# Patient Record
Sex: FEMALE | Race: WHITE | NOT HISPANIC OR LATINO | URBAN - METROPOLITAN AREA
[De-identification: names, ages, dates, MRNs, and addresses within clinical notes are randomized per-mention and may not be internally consistent; named-entity substitution may affect disease eponyms.]

---

## 2017-01-04 ENCOUNTER — OUTPATIENT (OUTPATIENT)
Dept: OUTPATIENT SERVICES | Facility: HOSPITAL | Age: 37
LOS: 1 days | End: 2017-01-04
Payer: MEDICAID

## 2017-01-04 DIAGNOSIS — M79.671 PAIN IN RIGHT FOOT: ICD-10-CM

## 2017-01-18 ENCOUNTER — APPOINTMENT (OUTPATIENT)
Dept: RHEUMATOLOGY | Facility: CLINIC | Age: 37
End: 2017-01-18

## 2017-01-18 VITALS
HEART RATE: 108 BPM | OXYGEN SATURATION: 97 % | BODY MASS INDEX: 42.86 KG/M2 | HEIGHT: 61 IN | WEIGHT: 227 LBS | DIASTOLIC BLOOD PRESSURE: 103 MMHG | SYSTOLIC BLOOD PRESSURE: 150 MMHG

## 2017-01-18 DIAGNOSIS — M25.562 PAIN IN RIGHT KNEE: ICD-10-CM

## 2017-01-18 DIAGNOSIS — M25.561 PAIN IN RIGHT KNEE: ICD-10-CM

## 2017-01-19 LAB
25(OH)D3 SERPL-MCNC: 13.9 NG/ML
APPEARANCE: CLEAR
BILIRUBIN URINE: NEGATIVE
BLOOD URINE: NEGATIVE
C3 SERPL-MCNC: 151 MG/DL
C4 SERPL-MCNC: 39 MG/DL
COLOR: YELLOW
CRP SERPL-MCNC: 0.45 MG/DL
DEPRECATED KAPPA LC FREE/LAMBDA SER: 1.07 RATIO
DSDNA AB SER-ACNC: <12 IU/ML
ERYTHROCYTE [SEDIMENTATION RATE] IN BLOOD BY WESTERGREN METHOD: 19 MM/HR
FERRITIN SERPL-MCNC: 20.2 NG/ML
FOLATE SERPL-MCNC: 6.9 NG/ML
GLUCOSE QUALITATIVE U: NORMAL MG/DL
IGA SER QL IEP: 117 MG/DL
IGG SER QL IEP: 982 MG/DL
IGM SER QL IEP: 65 MG/DL
IRON SATN MFR SERPL: 11 %
IRON SERPL-MCNC: 36 UG/DL
KAPPA LC CSF-MCNC: 1.08 MG/DL
KAPPA LC SERPL-MCNC: 1.16 MG/DL
KETONES URINE: ABNORMAL
LEUKOCYTE ESTERASE URINE: NEGATIVE
NITRITE URINE: NEGATIVE
PH URINE: 6
PROTEIN URINE: NEGATIVE MG/DL
RHEUMATOID FACT SER QL: <7 IU/ML
SPECIFIC GRAVITY URINE: 1.04
TIBC SERPL-MCNC: 319 UG/DL
UIBC SERPL-MCNC: 283 UG/DL
UROBILINOGEN URINE: 1 MG/DL
VIT B12 SERPL-MCNC: 563 PG/ML

## 2017-02-07 ENCOUNTER — APPOINTMENT (OUTPATIENT)
Dept: RHEUMATOLOGY | Facility: CLINIC | Age: 37
End: 2017-02-07

## 2017-02-07 VITALS — SYSTOLIC BLOOD PRESSURE: 150 MMHG | HEIGHT: 61 IN | DIASTOLIC BLOOD PRESSURE: 106 MMHG | HEART RATE: 106 BPM

## 2017-02-07 DIAGNOSIS — B35.9 DERMATOPHYTOSIS, UNSPECIFIED: ICD-10-CM

## 2017-02-07 DIAGNOSIS — E61.1 IRON DEFICIENCY: ICD-10-CM

## 2017-03-08 PROCEDURE — 97112 NEUROMUSCULAR REEDUCATION: CPT

## 2017-03-08 PROCEDURE — 97116 GAIT TRAINING THERAPY: CPT

## 2017-03-08 PROCEDURE — 97162 PT EVAL MOD COMPLEX 30 MIN: CPT

## 2017-03-08 PROCEDURE — 97110 THERAPEUTIC EXERCISES: CPT

## 2017-03-08 PROCEDURE — 97140 MANUAL THERAPY 1/> REGIONS: CPT

## 2017-04-07 ENCOUNTER — APPOINTMENT (OUTPATIENT)
Dept: RHEUMATOLOGY | Facility: CLINIC | Age: 37
End: 2017-04-07

## 2017-04-07 DIAGNOSIS — M17.10 UNILATERAL PRIMARY OSTEOARTHRITIS, UNSPECIFIED KNEE: ICD-10-CM

## 2017-04-07 DIAGNOSIS — L30.9 DERMATITIS, UNSPECIFIED: ICD-10-CM

## 2017-04-07 DIAGNOSIS — L50.9 URTICARIA, UNSPECIFIED: ICD-10-CM

## 2017-04-07 RX ORDER — CLOTRIMAZOLE 10 MG/G
1 CREAM TOPICAL
Qty: 1 | Refills: 2 | Status: DISCONTINUED | COMMUNITY
Start: 2017-02-07 | End: 2017-04-07

## 2017-04-07 RX ORDER — METRONIDAZOLE 7.5 MG/G
0.75 CREAM TOPICAL DAILY
Qty: 1 | Refills: 2 | Status: DISCONTINUED | COMMUNITY
Start: 2017-02-07 | End: 2017-04-07

## 2017-04-07 RX ORDER — CETIRIZINE HYDROCHLORIDE 10 MG/1
10 TABLET, COATED ORAL DAILY
Qty: 30 | Refills: 2 | Status: DISCONTINUED | COMMUNITY
Start: 2017-02-07 | End: 2017-04-07

## 2017-04-08 PROBLEM — M17.10 KNEE OSTEOARTHRITIS: Status: ACTIVE | Noted: 2017-02-07

## 2017-04-08 PROBLEM — L50.9 URTICARIA: Status: ACTIVE | Noted: 2017-02-07

## 2018-01-22 ENCOUNTER — INPATIENT (INPATIENT)
Facility: HOSPITAL | Age: 38
LOS: 3 days | Discharge: HOME | DRG: 305 | End: 2018-01-26
Attending: INTERNAL MEDICINE | Admitting: INTERNAL MEDICINE
Payer: MEDICAID

## 2018-01-22 VITALS
RESPIRATION RATE: 20 BRPM | TEMPERATURE: 99 F | OXYGEN SATURATION: 99 % | HEART RATE: 124 BPM | SYSTOLIC BLOOD PRESSURE: 177 MMHG | WEIGHT: 225.09 LBS | HEIGHT: 62 IN | DIASTOLIC BLOOD PRESSURE: 118 MMHG

## 2018-01-22 LAB
ALBUMIN SERPL ELPH-MCNC: 4.9 G/DL — SIGNIFICANT CHANGE UP (ref 3.3–5)
ALP SERPL-CCNC: 71 U/L — SIGNIFICANT CHANGE UP (ref 40–120)
ALT FLD-CCNC: 19 U/L RC — SIGNIFICANT CHANGE UP (ref 10–45)
ANION GAP SERPL CALC-SCNC: 17 MMOL/L — SIGNIFICANT CHANGE UP (ref 5–17)
AST SERPL-CCNC: 23 U/L — SIGNIFICANT CHANGE UP (ref 10–40)
BASOPHILS # BLD AUTO: 0 K/UL — SIGNIFICANT CHANGE UP (ref 0–0.2)
BASOPHILS NFR BLD AUTO: 0.4 % — SIGNIFICANT CHANGE UP (ref 0–2)
BILIRUB SERPL-MCNC: 0.4 MG/DL — SIGNIFICANT CHANGE UP (ref 0.2–1.2)
BUN SERPL-MCNC: 8 MG/DL — SIGNIFICANT CHANGE UP (ref 7–23)
CALCIUM SERPL-MCNC: 9.9 MG/DL — SIGNIFICANT CHANGE UP (ref 8.4–10.5)
CHLORIDE SERPL-SCNC: 98 MMOL/L — SIGNIFICANT CHANGE UP (ref 96–108)
CO2 SERPL-SCNC: 22 MMOL/L — SIGNIFICANT CHANGE UP (ref 22–31)
CREAT SERPL-MCNC: 0.76 MG/DL — SIGNIFICANT CHANGE UP (ref 0.5–1.3)
EOSINOPHIL # BLD AUTO: 0 K/UL — SIGNIFICANT CHANGE UP (ref 0–0.5)
EOSINOPHIL NFR BLD AUTO: 0.3 % — SIGNIFICANT CHANGE UP (ref 0–6)
GLUCOSE SERPL-MCNC: 103 MG/DL — HIGH (ref 70–99)
HCT VFR BLD CALC: 42.9 % — SIGNIFICANT CHANGE UP (ref 34.5–45)
HGB BLD-MCNC: 15 G/DL — SIGNIFICANT CHANGE UP (ref 11.5–15.5)
LYMPHOCYTES # BLD AUTO: 2.8 K/UL — SIGNIFICANT CHANGE UP (ref 1–3.3)
LYMPHOCYTES # BLD AUTO: 24.4 % — SIGNIFICANT CHANGE UP (ref 13–44)
MAGNESIUM SERPL-MCNC: 2 MG/DL — SIGNIFICANT CHANGE UP (ref 1.6–2.6)
MCHC RBC-ENTMCNC: 28.7 PG — SIGNIFICANT CHANGE UP (ref 27–34)
MCHC RBC-ENTMCNC: 35 GM/DL — SIGNIFICANT CHANGE UP (ref 32–36)
MCV RBC AUTO: 82.1 FL — SIGNIFICANT CHANGE UP (ref 80–100)
MONOCYTES # BLD AUTO: 0.6 K/UL — SIGNIFICANT CHANGE UP (ref 0–0.9)
MONOCYTES NFR BLD AUTO: 5.1 % — SIGNIFICANT CHANGE UP (ref 2–14)
NEUTROPHILS # BLD AUTO: 8 K/UL — HIGH (ref 1.8–7.4)
NEUTROPHILS NFR BLD AUTO: 69.7 % — SIGNIFICANT CHANGE UP (ref 43–77)
PHOSPHATE SERPL-MCNC: 2.2 MG/DL — LOW (ref 2.5–4.5)
PLATELET # BLD AUTO: 376 K/UL — SIGNIFICANT CHANGE UP (ref 150–400)
POTASSIUM SERPL-MCNC: 3.6 MMOL/L — SIGNIFICANT CHANGE UP (ref 3.5–5.3)
POTASSIUM SERPL-SCNC: 3.6 MMOL/L — SIGNIFICANT CHANGE UP (ref 3.5–5.3)
PROT SERPL-MCNC: 8.4 G/DL — HIGH (ref 6–8.3)
RBC # BLD: 5.22 M/UL — HIGH (ref 3.8–5.2)
RBC # FLD: 12.6 % — SIGNIFICANT CHANGE UP (ref 10.3–14.5)
SODIUM SERPL-SCNC: 137 MMOL/L — SIGNIFICANT CHANGE UP (ref 135–145)
WBC # BLD: 11.5 K/UL — HIGH (ref 3.8–10.5)
WBC # FLD AUTO: 11.5 K/UL — HIGH (ref 3.8–10.5)

## 2018-01-22 PROCEDURE — 93010 ELECTROCARDIOGRAM REPORT: CPT

## 2018-01-22 PROCEDURE — 99285 EMERGENCY DEPT VISIT HI MDM: CPT | Mod: 25

## 2018-01-22 PROCEDURE — 71275 CT ANGIOGRAPHY CHEST: CPT | Mod: 26

## 2018-01-22 PROCEDURE — 71046 X-RAY EXAM CHEST 2 VIEWS: CPT | Mod: 26

## 2018-01-22 RX ORDER — SERTRALINE 25 MG/1
100 TABLET, FILM COATED ORAL ONCE
Qty: 0 | Refills: 0 | Status: COMPLETED | OUTPATIENT
Start: 2018-01-22 | End: 2018-01-22

## 2018-01-22 RX ORDER — DIPHENHYDRAMINE HCL 50 MG
25 CAPSULE ORAL ONCE
Qty: 0 | Refills: 0 | Status: COMPLETED | OUTPATIENT
Start: 2018-01-22 | End: 2018-01-22

## 2018-01-22 RX ORDER — SODIUM CHLORIDE 9 MG/ML
1000 INJECTION INTRAMUSCULAR; INTRAVENOUS; SUBCUTANEOUS ONCE
Qty: 0 | Refills: 0 | Status: COMPLETED | OUTPATIENT
Start: 2018-01-22 | End: 2018-01-22

## 2018-01-22 RX ADMIN — SODIUM CHLORIDE 1000 MILLILITER(S): 9 INJECTION INTRAMUSCULAR; INTRAVENOUS; SUBCUTANEOUS at 19:26

## 2018-01-22 RX ADMIN — Medication 25 MILLIGRAM(S): at 19:28

## 2018-01-22 RX ADMIN — SERTRALINE 100 MILLIGRAM(S): 25 TABLET, FILM COATED ORAL at 20:48

## 2018-01-22 RX ADMIN — Medication 40 MILLIGRAM(S): at 19:25

## 2018-01-22 NOTE — ED PROVIDER NOTE - MEDICAL DECISION MAKING DETAILS
Patient presenting with palpitations and diffuse urticaria. Likely allergic reaction, but unknown etiology. Will treat with benadryl, IV fluids and prednisone. Check TSH.

## 2018-01-22 NOTE — ED PROVIDER NOTE - PHYSICAL EXAMINATION
General: NAD, anxious; sitting up in bed  HEENT: PERRL; normal oropharynx  Neck: no JVD  Respiratory: CTAB  CV: tachcyardic, no murmurs  GI: abdomen soft, non-tender, non-distended  Neurology: AAOx3, no focal deficits  Psych: anxious  Extremities: No edema, + peripheral pulses  Skin: diffuse urticaria on chest

## 2018-01-22 NOTE — ED PROVIDER NOTE - OBJECTIVE STATEMENT
37-year-old woman with history of anxiety and frequent hives who presents to the ED with palpitations. The patient has been having these symptoms for the past 1 week. 37-year-old woman with history of anxiety and frequent hives who presents to the ED with palpitations. The patient has been having these symptoms for the past 1 week. She has been feeling palpitations occasionally for many years, but they have been exacerbated over the past week. She recently had her valium decreased from 10 mg to 5 mg. Her hives have been going on for several years and have also increased over the past few days. She endorses some anxiety, diarrhea, chest pressure but no nausea/vomiting. She is a former smoker. She says she has had normal thyroid testing in the past and was recently found to have an elevated OSCAR.

## 2018-01-22 NOTE — ED PROVIDER NOTE - NS ED ROS FT
REVIEW OF SYSTEMS:    CONSTITUTIONAL: No weakness, fevers or chills  EYES/ENT: No visual changes;  No vertigo or throat pain   NECK: No pain or stiffness  RESPIRATORY: No cough, wheezing, hemoptysis; No shortness of breath  CARDIOVASCULAR: +No chest pain and palpitations  GASTROINTESTINAL: +diarrhea; No abdominal pain. No nausea, vomiting, or hematemesis  GENITOURINARY: No dysuria, frequency or hematuria  NEUROLOGICAL: No numbness or weakness  MUSCULOSKELETAL: No joint pain. Normal range of motion  SKIN: +rashes  All other review of systems is negative unless indicated above.

## 2018-01-22 NOTE — ED PROVIDER NOTE - PROGRESS NOTE DETAILS
Patient to go to CDU for echo and tele monitoring, repeat troponin and follow up tsh. Patient was seen by cardiology fellow in ED who agrees with plan.

## 2018-01-22 NOTE — ED ADULT NURSE NOTE - OBJECTIVE STATEMENT
36 y/o female presents to ed c/o chest pain. Pt states she has had this pain for the past week intermittently however the pain became worse last night. States she also feels palpations. Denies sob, ha, n/v/d, abdominal pain, f/c, urinary symptoms, hematuria. A&Ox4, vss, skin warm dry and intact, MAEx4, lungs CTA, abd soft nondistended. Sinus tachycardic on presentation, placed on cardiac monitor. Pt resting comfortably with VSS, no complaints at this time. Patient's bed in the lowest position, explained plan of care to patient and family members. Will continue to reassess.

## 2018-01-23 DIAGNOSIS — F06.4 ANXIETY DISORDER DUE TO KNOWN PHYSIOLOGICAL CONDITION: ICD-10-CM

## 2018-01-23 DIAGNOSIS — I16.0 HYPERTENSIVE URGENCY: ICD-10-CM

## 2018-01-23 DIAGNOSIS — I10 ESSENTIAL (PRIMARY) HYPERTENSION: ICD-10-CM

## 2018-01-23 LAB
TROPONIN T SERPL-MCNC: <0.01 NG/ML — SIGNIFICANT CHANGE UP (ref 0–0.06)
TSH SERPL-MCNC: 0.74 UIU/ML — SIGNIFICANT CHANGE UP (ref 0.27–4.2)

## 2018-01-23 PROCEDURE — 99223 1ST HOSP IP/OBS HIGH 75: CPT

## 2018-01-23 PROCEDURE — 93306 TTE W/DOPPLER COMPLETE: CPT | Mod: 26

## 2018-01-23 PROCEDURE — 90792 PSYCH DIAG EVAL W/MED SRVCS: CPT | Mod: GC

## 2018-01-23 PROCEDURE — 99236 HOSP IP/OBS SAME DATE HI 85: CPT

## 2018-01-23 RX ORDER — ZOLPIDEM TARTRATE 10 MG/1
5 TABLET ORAL AT BEDTIME
Qty: 0 | Refills: 0 | Status: DISCONTINUED | OUTPATIENT
Start: 2018-01-23 | End: 2018-01-24

## 2018-01-23 RX ORDER — BUPRENORPHINE AND NALOXONE 2; .5 MG/1; MG/1
2 TABLET SUBLINGUAL ONCE
Qty: 0 | Refills: 0 | Status: DISCONTINUED | OUTPATIENT
Start: 2018-01-23 | End: 2018-01-23

## 2018-01-23 RX ORDER — DIAZEPAM 5 MG
5 TABLET ORAL ONCE
Qty: 0 | Refills: 0 | Status: DISCONTINUED | OUTPATIENT
Start: 2018-01-23 | End: 2018-01-23

## 2018-01-23 RX ORDER — PROPRANOLOL HCL 160 MG
40 CAPSULE, EXTENDED RELEASE 24HR ORAL THREE TIMES A DAY
Qty: 0 | Refills: 0 | Status: DISCONTINUED | OUTPATIENT
Start: 2018-01-23 | End: 2018-01-23

## 2018-01-23 RX ORDER — SODIUM CHLORIDE 9 MG/ML
3 INJECTION INTRAMUSCULAR; INTRAVENOUS; SUBCUTANEOUS EVERY 8 HOURS
Qty: 0 | Refills: 0 | Status: DISCONTINUED | OUTPATIENT
Start: 2018-01-23 | End: 2018-01-26

## 2018-01-23 RX ORDER — SERTRALINE 25 MG/1
100 TABLET, FILM COATED ORAL DAILY
Qty: 0 | Refills: 0 | Status: DISCONTINUED | OUTPATIENT
Start: 2018-01-23 | End: 2018-01-26

## 2018-01-23 RX ORDER — BUPRENORPHINE AND NALOXONE 2; .5 MG/1; MG/1
2 TABLET SUBLINGUAL DAILY
Qty: 0 | Refills: 0 | Status: DISCONTINUED | OUTPATIENT
Start: 2018-01-23 | End: 2018-01-26

## 2018-01-23 RX ORDER — INFLUENZA VIRUS VACCINE 15; 15; 15; 15 UG/.5ML; UG/.5ML; UG/.5ML; UG/.5ML
0.5 SUSPENSION INTRAMUSCULAR ONCE
Qty: 0 | Refills: 0 | Status: DISCONTINUED | OUTPATIENT
Start: 2018-01-23 | End: 2018-01-26

## 2018-01-23 RX ORDER — PROPRANOLOL HCL 160 MG
80 CAPSULE, EXTENDED RELEASE 24HR ORAL THREE TIMES A DAY
Qty: 0 | Refills: 0 | Status: DISCONTINUED | OUTPATIENT
Start: 2018-01-23 | End: 2018-01-24

## 2018-01-23 RX ADMIN — SODIUM CHLORIDE 3 MILLILITER(S): 9 INJECTION INTRAMUSCULAR; INTRAVENOUS; SUBCUTANEOUS at 06:25

## 2018-01-23 RX ADMIN — BUPRENORPHINE AND NALOXONE 2 TABLET(S): 2; .5 TABLET SUBLINGUAL at 13:27

## 2018-01-23 RX ADMIN — Medication 40 MILLIGRAM(S): at 13:16

## 2018-01-23 RX ADMIN — ZOLPIDEM TARTRATE 5 MILLIGRAM(S): 10 TABLET ORAL at 02:12

## 2018-01-23 RX ADMIN — Medication 5 MILLIGRAM(S): at 08:46

## 2018-01-23 RX ADMIN — Medication 40 MILLIGRAM(S): at 20:57

## 2018-01-23 RX ADMIN — Medication 0.1 MILLIGRAM(S): at 01:46

## 2018-01-23 RX ADMIN — Medication 0.2 MILLIGRAM(S): at 22:30

## 2018-01-23 RX ADMIN — SERTRALINE 100 MILLIGRAM(S): 25 TABLET, FILM COATED ORAL at 20:03

## 2018-01-23 RX ADMIN — BUPRENORPHINE AND NALOXONE 2 TABLET(S): 2; .5 TABLET SUBLINGUAL at 14:03

## 2018-01-23 RX ADMIN — SODIUM CHLORIDE 3 MILLILITER(S): 9 INJECTION INTRAMUSCULAR; INTRAVENOUS; SUBCUTANEOUS at 16:03

## 2018-01-23 RX ADMIN — Medication 80 MILLIGRAM(S): at 22:30

## 2018-01-23 RX ADMIN — SODIUM CHLORIDE 3 MILLILITER(S): 9 INJECTION INTRAMUSCULAR; INTRAVENOUS; SUBCUTANEOUS at 20:56

## 2018-01-23 RX ADMIN — Medication 1 MILLIGRAM(S): at 19:47

## 2018-01-23 NOTE — ED BEHAVIORAL HEALTH ASSESSMENT NOTE - SUMMARY
38 yo  F, unemployed, domiciled with family with history of Anxiety, substance dependence, HTN, Chronic Hives admitted to observation with HTN Urgency and Tachycardia and anxiety.    1) Resume home medication with Diazepam 5mg PO qAM and 2.5mg PO qHS    2) Resume Zolpidem 10mg PO qHS.   3) Continue Zoloft 100mg qday.   4)  Pt at home with 0.5 Film of 8mg suboxone daily at noon, can be simplified to a 4mg-1mg Film daily at noon.   5) May continue clonidine 0.2mg once daily unless adjusted by cardiology.   6) HTN/Tachy management as per primary team.   7) Discussed plan with patient who agrees.  8) Defer hydroxyzine dose to primary team - pt taking around 25-50 mg daily

## 2018-01-23 NOTE — ED CDU PROVIDER INITIAL DAY NOTE - PHYSICAL EXAMINATION
CONSTITUTIONAL: Patient is awake, alert and oriented x 3. Patient is anxious appearing and in no acute distress.  HEAD: NCAT,   EYES: PERRL b/l, EOMI,  NECK: supple,   LUNGS: CTA B/L,  HEART: Tachycardic RR.+S1S2 no murmurs,   ABDOMEN: Soft nd/nt+bs no rebound or guarding.   EXTREMITY: no edema or calf tenderness b/l, FROM upper and lower ext b/l  SKIN: diffuse erythema on chest and cheeks b/l  NEURO: No focal deficits

## 2018-01-23 NOTE — ED BEHAVIORAL HEALTH ASSESSMENT NOTE - CASE SUMMARY
38 y/o F with anxiety, hx of substance dependence, admitted for cardiac work up, became paranoid last night. Not psychotic at present, but taking a complex regimen of meds at home. As pt is not likely to be in hospital long, would continue meds as written. Cannot rule out withdrawal as cause for pt's presentation yesterday.   1) Resume home medication with Diazepam 5mg PO qAM and 2.5mg PO qHS, Zolpidem 10mg PO qHS, Zoloft 100mg qday, Suboxone 4mg-1mg Film daily at noon, clonidine 0.2mg once daily unless adjusted by cardiology, defer hydroxyzine 25-50mg dosing to primary team.

## 2018-01-23 NOTE — ED CDU PROVIDER SUBSEQUENT DAY NOTE - HISTORY
CDU NOTE CAROLIN Pittman: Patient continues to be in sinus tach on tele. Extremely anxious as per previous note. Patient also appears paranoid and things that we are trying to trigger her while in CDU by "having patients cough" and exposing her to germs to evaluate what causes her BP to rise and her heart rate to go up. Nurse Lita informed red doctor of findings.

## 2018-01-23 NOTE — ED BEHAVIORAL HEALTH ASSESSMENT NOTE - MEDICATIONS (PRESCRIPTIONS, DIRECTIONS)
1) Resume home medicaiton with Diazepam 5mg PO qAM and 2.5mg PO qHS  2) Resume Zolpidem 10mg PO qHS. 3) Continue Zoloft 100mg qday. 4)  Pt at home with 0.5 Film of 8mg suboxone daily at noon, can be simplified to a 4mg-1mg Film daily at noon. 5) May continue clonidine 0.2mg once daily unless adjusted by cardiology. 6) HTN/Tachy management as per primery team. 7) Discussed plan with patient who agrees.

## 2018-01-23 NOTE — ED CDU PROVIDER INITIAL DAY NOTE - PROGRESS NOTE DETAILS
CDU NOTE CAROLIN Pittman: VSS NAD. Patient asked to talk. States that she is feeling very anxious. States that she was not completely honest about all her medications earlier and did not disclose that she has been on Suboxone for the past 6 years secondary to previous narcotic problem and also has home medications including clonidine xanax and valium in her bag. States that she has no intention on taking her home meds but prefers in they are locked up because she gets anxiety that the staff will think she will take them.  Patient states that she has been under a lot of stress recently, intermittent tearfulness during conversation has started seeing new psych NP who has adjusted her meds from xanax to valium. Has been anxious secondary to upcoming anniversary of her cousins death.  States she has never abused her current medications. Does not appear to be at harm to herself or others.  Will continue to monitor CDU NOTE CAROLIN Pittman: VSS NAD. Patient asked to talk. States that she is feeling very anxious. States that she was not completely honest about all her medications earlier and did not disclose that she has been on Suboxone for the past 6 years secondary to previous narcotic problem and also has home medications including clonidine xanax and valium in her bag. States that she has no intention on taking her home meds but prefers in they are locked up because she gets anxiety that the staff will think she will take them.  Patient states that she has been under a lot of stress recently, intermittent tearfulness during conversation has started seeing new psych NP who has adjusted her meds from xanax to valium. Has been anxious secondary to upcoming anniversary of her cousins death.  States she has never abused her current medications. Does not appear to be at harm to herself or others. Patient states she feels less anxious now that she has gotten things off her chest.  Will continue to monitor. May need psych consult prior to d/c. Meds seen and counted with nurse, therese secured in pharmacy

## 2018-01-23 NOTE — ED CDU PROVIDER INITIAL DAY NOTE - DETAILS
Frequent reevals, Vitals q 4, tele, repeat trop/ekg, tte, cards following   -discussed w/ attending dr thomas

## 2018-01-23 NOTE — ED CDU PROVIDER INITIAL DAY NOTE - OBJECTIVE STATEMENT
37-year-old woman with history of anxiety and frequent hives who presents to the ED with palpitations. The patient has been having these symptoms for the past 1 week. She has been feeling palpitations occasionally for many years, but they have been exacerbated over the past week. She recently had her valium decreased from 10 mg to 5 mg. Her hives have been going on for several years and have also increased over the past few days. She endorses some anxiety, diarrhea, chest pressure but no nausea/vomiting. She is a former smoker. She says she has had normal thyroid testing in the past and was recently found to have an elevated OSCAR.

## 2018-01-23 NOTE — H&P ADULT - NSHPLABSRESULTS_GEN_ALL_CORE
LABS:                        15.0   11.5  )-----------( 376      ( 22 Jan 2018 19:02 )             42.9     01-22    137  |  98  |  8   ----------------------------<  103<H>  3.6   |  22  |  0.76    Ca    9.9      22 Jan 2018 19:02  Phos  2.2     01-22  Mg     2.0     01-22    TPro  8.4<H>  /  Alb  4.9  /  TBili  0.4  /  DBili  x   /  AST  23  /  ALT  19  /  AlkPhos  71  01-22          Troponin T, Serum: <0.01 ng/mL (01-23-18 @ 02:56)  Troponin T, Serum: <0.01 ng/mL (01-22-18 @ 19:02)      RADIOLOGY & ADDITIONAL TESTS:

## 2018-01-23 NOTE — ED ADULT NURSE REASSESSMENT NOTE - NS ED NURSE REASSESS COMMENT FT1
Upon further discussing patients anxiety, she denies SI/HI. She states she has racing thoughts. Medications were dropped at pharmacy. CAROLIN Quintero aware. Will continue to monitor.

## 2018-01-23 NOTE — ED ADULT NURSE REASSESSMENT NOTE - NS ED NURSE REASSESS COMMENT FT1
Patient appears less anxious- she is laying in bed. CAROLIN HuntIngrid aware of patients vital signs.

## 2018-01-23 NOTE — ED ADULT NURSE REASSESSMENT NOTE - NS ED NURSE REASSESS COMMENT FT1
Patient comes to CDU on tele monitor. She comes tachycardic and hypertensive. She was medicated at arrival to CDU. She appears very anxious. She states she has not slept in 2 nights, states she feels palpitations and feels anxious. Endorses to RN that she has medications in her purse- medications secured by RN and PA. Patient states she has changes to her anxiolytic medications. States stressor of anniversary of cousins death coming causing her more distress. Patient also states she constantly feels as if she is "a bother" to other people. She apologizes very often to providers. PA at patients bedside at this time. Remains on cardiac monitor. Will perform serial BP checks. Repeat EKG and troponins drawn. Will continue to monitor.

## 2018-01-23 NOTE — ED CDU PROVIDER DISPOSITION NOTE - PLAN OF CARE
1. Follow up with your PMD  within 48-72 hours. Recommend cardiology follow up call [phone] to make and appointment    2. Recommend close follow up with psych  2. Show copies of your reports given to you .  Take all of your other medications as previously prescribed.   3. Worsening or continued palpitations, shortness of breath, chest pain, weakness, return to ED.

## 2018-01-23 NOTE — ED CDU PROVIDER SUBSEQUENT DAY NOTE - MEDICAL DECISION MAKING DETAILS
38yo F hx of anxiety htn pw palpitations, hypertensive in the ED tachy--send to CDU for tele monitoring trop, echo and cardiology re-evalluation--Dr. Estrella evaluated pt in the am, BP improved but coming back up-- pending TTE --will admit for hypertensive emergency 38yo F hx of anxiety htn pw palpitations, hypertensive in the ED tachy--send to CDU for tele monitoring trop, echo and cardiology re-evalluation--Dr. Estrella evaluated pt in the am, BP improved but coming back up-- pending TTE --will admit for hypertensive emergency; pt also admitted to taking suboxine valim xanax for anxiety appeared anxious this morning bp improved initially after getting valium ?withdrawal; psych  consulted to ad for anxiety

## 2018-01-23 NOTE — ED BEHAVIORAL HEALTH ASSESSMENT NOTE - DIFFERENTIAL
Anxiety disorder with possible panic attack  Medication Withdrawal: No apparent withdrawal symptoms on exam.   Hypertensive disorder: Work up in progress.

## 2018-01-23 NOTE — ED CDU PROVIDER SUBSEQUENT DAY NOTE - ATTENDING CONTRIBUTION TO CARE
I, Nettie Godinez MD have personally performed a face to face diagnostic evaluation on this patient.  I have reviewed the ACP note and agree with the history, exam, and plan of care,

## 2018-01-23 NOTE — ED CDU PROVIDER DISPOSITION NOTE - CLINICAL COURSE
37-year-old woman with history of anxiety and frequent hives who presents to the ED with palpitations. The patient has been having these symptoms for the past 1 week. She has been feeling palpitations occasionally for many years, but they have been exacerbated over the past week. She recently had her valium decreased from 10 mg to 5 mg. Her hives have been going on for several years and have also increased over the past few days. She endorses some anxiety, diarrhea, chest pressure but no nausea/vomiting. She is a former smoker. She says she has had normal thyroid testing in the past and was recently found to have an elevated OSCAR. In ED patient w/ trop negative, CTA negative for PE. Seen by cards who recommended TTE. Patient anxious upon initial presentation, became extremely anxious and paranoid while in CDU without concern for harm to herself or others. Psych..... 37-year-old woman with history of anxiety and frequent hives who presents to the ED with palpitations. The patient has been having these symptoms for the past 1 week. She has been feeling palpitations occasionally for many years, but they have been exacerbated over the past week. She recently had her valium decreased from 10 mg to 5 mg. Her hives have been going on for several years and have also increased over the past few days. She endorses some anxiety, diarrhea, chest pressure but no nausea/vomiting. She is a former smoker. She says she has had normal thyroid testing in the past and was recently found to have an elevated OSCAR. In ED patient w/ trop negative, CTA negative for PE. Seen by cards who recommended TTE. Patient anxious upon initial presentation, became extremely anxious and paranoid while in CDU without concern for harm to herself or others. Patient persistently tachycardic and hypertensive in CDU. BP improved with valium 5mg PO. Patient evaluated by psychology. Also evaluated by Cardiology in AM, recommending admission for hypertensive urgency.

## 2018-01-23 NOTE — ED BEHAVIORAL HEALTH ASSESSMENT NOTE - HPI (INCLUDE ILLNESS QUALITY, SEVERITY, DURATION, TIMING, CONTEXT, MODIFYING FACTORS, ASSOCIATED SIGNS AND SYMPTOMS)
36 yo  F, unemployed, domiciled with family with history of Anxiety, HTN, Chronic Hives, currently on observation status after presenting to ED in anxious state and found to have hypertensive urgency and tachycardia. pt currently seen by medical team to r/o organic causes for initial presentation.   Pt states has long history of anxiety that has been treated with Alprazolam (Complex dose as per patient, takes 0.5 tab in AM and rest at night as needed to sleep) until a week ago when pt had first visit with Psychiatry and was changed to Diazepam (5mg tab once daily). Pt also has been on long term Suboxone (~8 years) for opioid dependence and takes 0.5 Film 8mg film at noon daily. Pt reports insomnia, and takes Zolpidem 10mg at night to help with sleep Pt also reports  was taking Benadryl (50-100mg) to help with urticaria and sometimes to aid sleep, that was also changed to Hydroxyzine.     Pt states on day prior to admission, started feeling unsafe at home, denies hallucinations or feeling being prosecuted, and states while trying to calm herself down, started getting more anxious and having more worrisome feelings as started feel chest tightness and pain and decided to go to ED.     At moment of exam, pt report feeling better, has not received Suboxone since admission. Denies tremors, confusion, agitation. States overnight felt "doctors were trying to do things to see if they could trigger her BP/HR". Stated this morning, while recalling last night events feels that was odd ideas for her. Denies suicidal or homicidal ideation.     FH: Anxiety in sister and cousin.   Was hospitalized in [facility] about 15 years ago for anxiety.  Social: Lives with Grandmother and Sister, currently unemployed, works subs as /. Support provided by Grandmother/Family    I-stop reviewed, consistent with history. Ref# [id number]
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
## 2018-01-23 NOTE — CONSULT NOTE ADULT - SUBJECTIVE AND OBJECTIVE BOX
CHIEF COMPLAINT: Palpitations    HISTORY OF PRESENT ILLNESS: The Pt is a 36 y/o woman with h/o Chronic Urticaria, suspected Autoimmune Disease, Anxiety, and HTN who presented to the ED with c/o frequent anxiety and palpitations for the past several weeks. She also reports some non-radiating chest pressure that is intermittent. She is currently taking Clonidine as prescribed by her PCP.       Allergies    Ceclor (Hives)  erythromycin (Hives)    Intolerances    	    MEDICATIONS:        zolpidem 5 milliGRAM(s) Oral at bedtime PRN  zolpidem 5 milliGRAM(s) Oral at bedtime PRN            PAST MEDICAL & SURGICAL HISTORY:  Hypertension      FAMILY HISTORY:      SOCIAL HISTORY:    [ ] Non-smoker  [ ] Smoker  [ ] Alcohol      REVIEW OF SYSTEMS:  General: no fatigue/malaise, weight loss/gain.  Skin: +rash  Ophthalmologic: no blurred vision, no loss of vision. 	  ENT: no sore throat, rhinorrhea, sinus congestion.  Respiratory: no SOB, cough or wheeze.  Gastrointestinal:  +D, no melena/hematemesis/hematochezia.  Genitourinary: no dysuria/hesitancy or hematuria.  Musculoskeletal: no myalgias or arthralgias.  Neurological: no changes in vision or hearing, no lightheadedness/dizziness, no syncope/near syncope	  Psychiatric: +stress/anxiety.   Hematology/Lymphatics: no unusual bleeding, bruising and no lymphadenopathy.  Endocrine: no unusual thirst.   All others negative except as stated above and in HPI.    PHYSICAL EXAM:  T(C): 36.9 (01-23-18 @ 01:31), Max: 37 (01-22-18 @ 16:41)  HR: 135 (01-23-18 @ 01:45) (112 - 135)  BP: 213/135 (01-23-18 @ 01:45) (166/127 - 213/135)  RR: 18 (01-23-18 @ 01:45) (18 - 20)  SpO2: 98% (01-23-18 @ 01:45) (98% - 99%)  Wt(kg): --  I&O's Summary      Appearance: Normal	  HEENT:   Normal oral mucosa, PERRL, EOMI	  Cardiovascular: regular, tachy, no murmurs, no edema  Respiratory: Lungs clear to auscultation	  Psychiatry: A & O x 3, Mood & affect appropriate  Gastrointestinal:  Soft, Non-tender, + BS	  Skin: No ecchymoses, No cyanosis	  Neurologic: Non-focal  Extremities: Normal range of motion, No clubbing, cyanosis or edema        LABS:	 	    CBC Full  -  ( 22 Jan 2018 19:02 )  WBC Count : 11.5 K/uL  Hemoglobin : 15.0 g/dL  Hematocrit : 42.9 %  Platelet Count - Automated : 376 K/uL  Mean Cell Volume : 82.1 fl  Mean Cell Hemoglobin : 28.7 pg  Mean Cell Hemoglobin Concentration : 35.0 gm/dL  Auto Neutrophil # : 8.0 K/uL  Auto Lymphocyte # : 2.8 K/uL  Auto Monocyte # : 0.6 K/uL  Auto Eosinophil # : 0.0 K/uL  Auto Basophil # : 0.0 K/uL  Auto Neutrophil % : 69.7 %  Auto Lymphocyte % : 24.4 %  Auto Monocyte % : 5.1 %  Auto Eosinophil % : 0.3 %  Auto Basophil % : 0.4 %    01-22    137  |  98  |  8   ----------------------------<  103<H>  3.6   |  22  |  0.76    Ca    9.9      22 Jan 2018 19:02  Phos  2.2     01-22  Mg     2.0     01-22    TPro  8.4<H>  /  Alb  4.9  /  TBili  0.4  /  DBili  x   /  AST  23  /  ALT  19  /  AlkPhos  71  01-22    CARDIAC MARKERS:    Troponin T, Serum (01.22.18 @ 19:02)    Troponin T, Serum: <0.01 ng/mL    ECG:  	  RADIOLOGY:  OTHER: 	  	  ASSESSMENT/PLAN: 36 y/o woman with h/o Chronic Urticaria, suspected Autoimmune Disease, Anxiety, and HTN who presented to the ED with c/o frequent anxiety and palpitations for the past several weeks.	    1) Palpitations - appears to be due to Sinus Tachycardia, unclear chronicity  - continue to monitor on Tele in CDU  - check TTE  - will consider further evaluation with ambulatory monitoring device    2) HTN - currently poorly controlled, maybe in the setting of anxiety with inconsistent Clonidine dosing and possible benzodiazepine withdrawal  - continue Clonidine, may need titration and possibly additional agents if still poorly controlled  - consider workup for Secondary HTN (perhaps as outpatient) if BP remains difficult to control CHIEF COMPLAINT: Palpitations    HISTORY OF PRESENT ILLNESS: The Pt is a 38 y/o woman with h/o Chronic Urticaria, suspected Autoimmune Disease, Anxiety, and HTN who presented to the ED with c/o frequent anxiety and palpitations for the past several weeks. She also reports some non-radiating chest pressure that is intermittent and occurs with anxiety. She is currently taking Clonidine as prescribed by her PCP, which she has been taking as a single daily dose. She has also been using benzodiazepines intermittently. She was treated for suspected allergic reaction with steroids and antihistamines with improvement in Urticaria, but continued intermittent anxiety and persistent Sinus Tachycardia.        Allergies    Ceclor (Hives)  erythromycin (Hives)    Intolerances    	    MEDICATIONS:        zolpidem 5 milliGRAM(s) Oral at bedtime PRN  zolpidem 5 milliGRAM(s) Oral at bedtime PRN            PAST MEDICAL & SURGICAL HISTORY:  Hypertension      FAMILY HISTORY:      SOCIAL HISTORY: Former-smoker        REVIEW OF SYSTEMS:  General: no fatigue/malaise, weight loss/gain.  Skin: +rash  Ophthalmologic: no blurred vision, no loss of vision. 	  ENT: no sore throat, rhinorrhea, sinus congestion.  Respiratory: no SOB, cough or wheeze.  Gastrointestinal:  +D, no melena/hematemesis/hematochezia.  Genitourinary: no dysuria/hesitancy or hematuria.  Musculoskeletal: no myalgias or arthralgias.  Neurological: no changes in vision or hearing, no lightheadedness/dizziness, no syncope/near syncope	  Psychiatric: +stress/anxiety.   Hematology/Lymphatics: no unusual bleeding, bruising and no lymphadenopathy.  Endocrine: no unusual thirst.   All others negative except as stated above and in HPI.    PHYSICAL EXAM:  T(C): 36.9 (01-23-18 @ 01:31), Max: 37 (01-22-18 @ 16:41)  HR: 135 (01-23-18 @ 01:45) (112 - 135)  BP: 213/135 (01-23-18 @ 01:45) (166/127 - 213/135)  RR: 18 (01-23-18 @ 01:45) (18 - 20)  SpO2: 98% (01-23-18 @ 01:45) (98% - 99%)  Wt(kg): --  I&O's Summary      Appearance: Normal	  HEENT:   Normal oral mucosa, PERRL, EOMI	  Cardiovascular: regular, tachy, no murmurs, no edema  Respiratory: Lungs clear to auscultation	  Psychiatry: A & O x 3, Mood & affect appropriate  Gastrointestinal:  Soft, Non-tender, + BS	  Skin: No ecchymoses, No cyanosis	  Neurologic: Non-focal  Extremities: Normal range of motion, No clubbing, cyanosis or edema        LABS:	 	    CBC Full  -  ( 22 Jan 2018 19:02 )  WBC Count : 11.5 K/uL  Hemoglobin : 15.0 g/dL  Hematocrit : 42.9 %  Platelet Count - Automated : 376 K/uL  Mean Cell Volume : 82.1 fl  Mean Cell Hemoglobin : 28.7 pg  Mean Cell Hemoglobin Concentration : 35.0 gm/dL  Auto Neutrophil # : 8.0 K/uL  Auto Lymphocyte # : 2.8 K/uL  Auto Monocyte # : 0.6 K/uL  Auto Eosinophil # : 0.0 K/uL  Auto Basophil # : 0.0 K/uL  Auto Neutrophil % : 69.7 %  Auto Lymphocyte % : 24.4 %  Auto Monocyte % : 5.1 %  Auto Eosinophil % : 0.3 %  Auto Basophil % : 0.4 %    01-22    137  |  98  |  8   ----------------------------<  103<H>  3.6   |  22  |  0.76    Ca    9.9      22 Jan 2018 19:02  Phos  2.2     01-22  Mg     2.0     01-22    TPro  8.4<H>  /  Alb  4.9  /  TBili  0.4  /  DBili  x   /  AST  23  /  ALT  19  /  AlkPhos  71  01-22    TSH: 0.74    CARDIAC MARKERS:    Troponin T, Serum (01.22.18 @ 19:02)    Troponin T, Serum: <0.01 ng/mL    ECG: Sinus Rhythm at 118 bpm, no ST changes 	    RADIOLOGY:    < from: CT Angio Chest w/ IV Cont (01.22.18 @ 22:22) >  FINDINGS:    CHEST:     LUNGS AND LARGE AIRWAYS: Patent central airways.  No pulmonary mass or   consolidation.  PLEURA: No pleural effusion.  VESSELS: Within normal limits.  HEART: Heart size is normal. No pericardial effusion.  MEDIASTINUM AND CARMEN: No lymphadenopathy.  CHEST WALL AND LOWER NECK: Within normal limits.  VISUALIZED UPPER ABDOMEN: Replaced right hepatic artery, an anatomical   variant is noted.  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    IMPRESSION: No pulmonary embolism.	  	  ASSESSMENT/PLAN: 38 y/o woman with h/o Chronic Urticaria, suspected Autoimmune Disease, Anxiety, and HTN who presented to the ED with c/o frequent anxiety and palpitations for the past several weeks.	    1) Palpitations with persistent Sinus Tachycardia - unclear chronicity in the setting of anxiety and inconsistent medication regimen  - continue to monitor on Tele in CDU  - check TTE, UDS  - will consider further evaluation with ambulatory monitoring device    2) HTN - currently poorly controlled, maybe in the setting of anxiety with inconsistent Clonidine dosing and possible benzodiazepine withdrawal  - continue Clonidine, suggest titrating to 0.2 mg PO Q8H, may need further titration and possibly additional agents if still poorly controlled  - consider workup for Secondary HTN (perhaps as outpatient) if BP remains difficult to control

## 2018-01-23 NOTE — ED CDU PROVIDER SUBSEQUENT DAY NOTE - PROGRESS NOTE DETAILS
Patient persistently anxious, pacing around CDU. Seen and evaluated at bedside. Patient re-evaluated by cardiology fellow, initially recommending clonidine 0.2mg q8, however called back to adjust recommendation to clonidine 0.1mg q8 in addition to labetolol 200mg q8 Patient persistently anxious, pacing around CDU. Seen and evaluated at bedside. Patient very flushed, anxious, tachycardic to 140bpm, BP elevated since in ED. Patient re-evaluated by cardiology fellow this AM, initially recommending clonidine 0.2mg q8, however called back to adjust recommendation to clonidine 0.1mg q8 in addition to labetolol 200mg q8. Patient given valium 5mg PO (Patient Istopped with reference #[id number]) and HR improved to < 120 bpm and BP to 139/99 bpm. Spoke to Cardiology and will hold BP meds until attending, Dr. Bales, comes to see patient. Also called psych for consult given patient's anxiety and paranoia, reported depression, and h/o substance abuse (on suboxone). Patient evaluated by Cardiologist, Dr. Bales, recommending admission for hypertensive urgency. D/w Dr. Godinez.

## 2018-01-24 ENCOUNTER — TRANSCRIPTION ENCOUNTER (OUTPATIENT)
Age: 38
End: 2018-01-24

## 2018-01-24 DIAGNOSIS — R00.0 TACHYCARDIA, UNSPECIFIED: ICD-10-CM

## 2018-01-24 LAB
ALDOST SERPL-MCNC: 14.9 NG/DL — SIGNIFICANT CHANGE UP
AMPHET UR-MCNC: NEGATIVE — SIGNIFICANT CHANGE UP
APPEARANCE UR: CLEAR — SIGNIFICANT CHANGE UP
BARBITURATES UR SCN-MCNC: NEGATIVE — SIGNIFICANT CHANGE UP
BENZODIAZ UR-MCNC: NEGATIVE — SIGNIFICANT CHANGE UP
BILIRUB UR-MCNC: NEGATIVE — SIGNIFICANT CHANGE UP
COCAINE METAB.OTHER UR-MCNC: NEGATIVE — SIGNIFICANT CHANGE UP
COD CRY URNS QL: ABNORMAL
COLOR SPEC: SIGNIFICANT CHANGE UP
DIFF PNL FLD: NEGATIVE — SIGNIFICANT CHANGE UP
EPI CELLS # UR: SIGNIFICANT CHANGE UP /HPF
GLUCOSE BLDC GLUCOMTR-MCNC: 109 MG/DL — HIGH (ref 70–99)
GLUCOSE UR QL: NEGATIVE — SIGNIFICANT CHANGE UP
KETONES UR-MCNC: ABNORMAL
LEUKOCYTE ESTERASE UR-ACNC: NEGATIVE — SIGNIFICANT CHANGE UP
METHADONE UR-MCNC: NEGATIVE — SIGNIFICANT CHANGE UP
NITRITE UR-MCNC: NEGATIVE — SIGNIFICANT CHANGE UP
OPIATES UR-MCNC: NEGATIVE — SIGNIFICANT CHANGE UP
OXYCODONE UR-MCNC: NEGATIVE — SIGNIFICANT CHANGE UP
PCP SPEC-MCNC: SIGNIFICANT CHANGE UP
PCP UR-MCNC: NEGATIVE — SIGNIFICANT CHANGE UP
PH UR: 6.5 — SIGNIFICANT CHANGE UP (ref 5–8)
PROT UR-MCNC: NEGATIVE — SIGNIFICANT CHANGE UP
RBC CASTS # UR COMP ASSIST: SIGNIFICANT CHANGE UP /HPF (ref 0–2)
SP GR SPEC: 1.01 — SIGNIFICANT CHANGE UP (ref 1.01–1.02)
THC UR QL: POSITIVE
UROBILINOGEN FLD QL: NEGATIVE — SIGNIFICANT CHANGE UP
WBC UR QL: SIGNIFICANT CHANGE UP /HPF (ref 0–5)

## 2018-01-24 PROCEDURE — 99233 SBSQ HOSP IP/OBS HIGH 50: CPT

## 2018-01-24 PROCEDURE — 70450 CT HEAD/BRAIN W/O DYE: CPT | Mod: 26

## 2018-01-24 RX ORDER — DIPHENHYDRAMINE HCL 50 MG
50 CAPSULE ORAL ONCE
Qty: 0 | Refills: 0 | Status: COMPLETED | OUTPATIENT
Start: 2018-01-24 | End: 2018-01-24

## 2018-01-24 RX ORDER — DIAZEPAM 5 MG
2 TABLET ORAL AT BEDTIME
Qty: 0 | Refills: 0 | Status: DISCONTINUED | OUTPATIENT
Start: 2018-01-24 | End: 2018-01-26

## 2018-01-24 RX ORDER — NIFEDIPINE 30 MG
60 TABLET, EXTENDED RELEASE 24 HR ORAL DAILY
Qty: 0 | Refills: 0 | Status: DISCONTINUED | OUTPATIENT
Start: 2018-01-24 | End: 2018-01-25

## 2018-01-24 RX ORDER — HYDROXYZINE HCL 10 MG
1 TABLET ORAL
Qty: 0 | Refills: 0 | COMMUNITY

## 2018-01-24 RX ORDER — DIAZEPAM 5 MG
5 TABLET ORAL
Qty: 0 | Refills: 0 | Status: DISCONTINUED | OUTPATIENT
Start: 2018-01-25 | End: 2018-01-26

## 2018-01-24 RX ORDER — QUETIAPINE FUMARATE 200 MG/1
50 TABLET, FILM COATED ORAL AT BEDTIME
Qty: 0 | Refills: 0 | Status: DISCONTINUED | OUTPATIENT
Start: 2018-01-24 | End: 2018-01-26

## 2018-01-24 RX ORDER — PROPRANOLOL HCL 160 MG
20 CAPSULE, EXTENDED RELEASE 24HR ORAL THREE TIMES A DAY
Qty: 0 | Refills: 0 | Status: DISCONTINUED | OUTPATIENT
Start: 2018-01-24 | End: 2018-01-25

## 2018-01-24 RX ORDER — DIAZEPAM 5 MG
5 TABLET ORAL ONCE
Qty: 0 | Refills: 0 | Status: DISCONTINUED | OUTPATIENT
Start: 2018-01-24 | End: 2018-01-24

## 2018-01-24 RX ORDER — PROPRANOLOL HCL 160 MG
1 CAPSULE, EXTENDED RELEASE 24HR ORAL
Qty: 42 | Refills: 0 | OUTPATIENT
Start: 2018-01-24 | End: 2018-02-06

## 2018-01-24 RX ADMIN — Medication 5 MILLIGRAM(S): at 10:38

## 2018-01-24 RX ADMIN — BUPRENORPHINE AND NALOXONE 2 TABLET(S): 2; .5 TABLET SUBLINGUAL at 12:39

## 2018-01-24 RX ADMIN — Medication 80 MILLIGRAM(S): at 06:29

## 2018-01-24 RX ADMIN — SODIUM CHLORIDE 3 MILLILITER(S): 9 INJECTION INTRAMUSCULAR; INTRAVENOUS; SUBCUTANEOUS at 05:41

## 2018-01-24 RX ADMIN — SERTRALINE 100 MILLIGRAM(S): 25 TABLET, FILM COATED ORAL at 12:38

## 2018-01-24 RX ADMIN — Medication 50 MILLIGRAM(S): at 04:30

## 2018-01-24 RX ADMIN — ZOLPIDEM TARTRATE 5 MILLIGRAM(S): 10 TABLET ORAL at 00:08

## 2018-01-24 RX ADMIN — SODIUM CHLORIDE 3 MILLILITER(S): 9 INJECTION INTRAMUSCULAR; INTRAVENOUS; SUBCUTANEOUS at 15:15

## 2018-01-24 RX ADMIN — Medication 2 MILLIGRAM(S): at 21:42

## 2018-01-24 RX ADMIN — ZOLPIDEM TARTRATE 5 MILLIGRAM(S): 10 TABLET ORAL at 01:19

## 2018-01-24 RX ADMIN — Medication 80 MILLIGRAM(S): at 13:16

## 2018-01-24 RX ADMIN — SODIUM CHLORIDE 3 MILLILITER(S): 9 INJECTION INTRAMUSCULAR; INTRAVENOUS; SUBCUTANEOUS at 21:24

## 2018-01-24 RX ADMIN — Medication 0.2 MILLIGRAM(S): at 21:28

## 2018-01-24 RX ADMIN — Medication 1 MILLIGRAM(S): at 02:22

## 2018-01-24 NOTE — PROGRESS NOTE ADULT - ASSESSMENT
37-year-old woman with history of anxiety and frequent hives who presents to the ED with palpitations. The patient has been having these symptoms for the past 1 week. She has been feeling palpitations occasionally for many years, but they have been exacerbated over the past week. She recently had her valium decreased from 10 mg to 5 mg. Her hives have been going on for several years and have also increased over the past few days. She endorses some anxiety, diarrhea, chest pressure but no nausea/vomiting. She is a former smoker.     symptoms have resolved  psych eval appreciated  cont with home psych meds  resume zoloft  ct chest and echo are normal  pt may be discharged and will follow up with her own psychiatrist.

## 2018-01-24 NOTE — CONSULT NOTE ADULT - ASSESSMENT
37F w/anxiety, depression, frequent urticaria p/w palp/chest tightness admitted for hypertensive urgency; consulted for possible anticholinergic toxicity. Although the patient was tachycardic and hypertensive on admission, this was more c/w panic attack given associated symptoms and history. She did not have dry, flushed skin, true mydriasis, delirium or urinary retention c/w anticholinergic toxicity; nor had she taken anticholinergics in excess for over a week. Although diphenhydramine is widely distributed; there has not been a case of delayed toxicity 2/2 to this. Additionally, the patient is on benzodiazepines and has been appropriately tapered; she does not display tremor or diaphoresis c/w benzodiazepine withdrawal. Would recommend management of insomnia and anxiety as per primary team and psychiatry. She is cleared from a toxicologic perspective. Please page [phone] with questions. 37F w/anxiety, depression, frequent urticaria p/w palp/chest tightness admitted for hypertensive urgency; consulted for possible anticholinergic toxicity. Although the patient was tachycardic and hypertensive on admission, this was more c/w possible panic attack given associated symptoms and history. She did not have dry, flushed skin, true mydriasis, delirium or urinary retention c/w anticholinergic toxicity; nor had she taken anticholinergics in excess for over a week. Although diphenhydramine is widely distributed; there has not been a case of delayed toxicity 2/2 to this. Additionally, the patient is on benzodiazepines and has been appropriately tapered; she does not display tremor or diaphoresis c/w benzodiazepine withdrawal. Would recommend management of insomnia and anxiety as per primary team and psychiatry. She is cleared from a toxicologic perspective. Please page [phone] with questions.

## 2018-01-24 NOTE — CONSULT NOTE ADULT - SUBJECTIVE AND OBJECTIVE BOX
MEDICAL TOXICOLOGY CONSULT    HPI: 37F w/anxiety, depression, frequent urticaria p/w palpitations/chest tightness. Was admitted for "hypertensive urgency". Toxicology was consulted for possible anticholinergic toxicity given patient's increasing paranoia, tachycardia/palpitations and possible mydriasis in the setting of taking 7-10 tabs of 25mg diphenhydramine daily for 1-2 years (broken up over the course of many hours); discontinued 1 week ago. Transitioned to 50mg hydroxyzine but hasn't taken since admission. Patient also has been taking xanax and was transitioned to valium; at first 10mg in the AM now 5mg in the AM 2.5m in the PM. She was started on propranolol in conjunction with cardiology for htn and received 50mg IV diphenhydramine late last night for insomnia. Patient has chronic insomnia. She is also on zolpidem and sertraline and denies other ingestions, medication misuse and drug abuse. Had diaphoresis localized to neck on admission, none since. Denies dry skin, urinary retention, hallucinations, tremor, seizures. +significant anxiety and panic attacks. No SI/HI, AH/VH.    ONSET / TIME of exposure(s): 1 week ago    QUANTITY of exposure(s): 175-250mg daily    ROUTE of exposure:  ingestion    CONTEXT of exposure: at home    ASSOCIATED symptoms: palpitations, tachycardia, paranoia    PAST MEDICAL & SURGICAL HISTORY:  Hypertension  No significant past surgical history        MEDICATION HISTORY:  buprenorphine 2 mG/naloxone 0.5 mG SL  Tablet 2 Tablet(s) SubLingual daily  cloNIDine 0.2 milliGRAM(s) Oral at bedtime  diazepam    Tablet 2 milliGRAM(s) Oral at bedtime  influenza   Vaccine 0.5 milliLiter(s) IntraMuscular once  LORazepam   Injectable 1 milliGRAM(s) IV Push every 6 hours PRN  propranolol 80 milliGRAM(s) Oral three times a day  QUEtiapine 50 milliGRAM(s) Oral at bedtime  sertraline 100 milliGRAM(s) Oral daily  sodium chloride 0.9% lock flush 3 milliLiter(s) IV Push every 8 hours      RECREATIONAL / ETHANOL / SUPPLEMENT USE: drinks socially, none recently; denies drug abuse and is former smoker    SOCIAL Hx:  lives with family,     FAMILY HISTORY: non-contributory      REVIEW OF SYSTEMS:    General:  no fever, chills, malaise, change in weight or fatigue  Eyes:  no blurry vision, double vision, or diminished acuity  ENT:  no tinnitus, decreased acuity, epistaxis, sore throat, dysphagia  Cardiac: +chest pain, syncope, or palpitations  Lung:  no cough, +shortness of breath, no stridor, or wheezing  GI:  no abdominal pain, nausea, vomiting, +diarrhea, noconstipation  Genitourinary: no dysuria, hematuria, or incontinence  Ortho: no joint pain, swelling, myalgias, atrophy, or spasm  Skin: +rash, lesions, or pruritis  Neuro:  no headache, weakness/numbness, ataxia, change in speech, dizziness, tremor, or seizure  Psych: __+__depression, _+___anxiety, _no___mania, ___no__suicidal, __no__homicidal  Endocrine: no polydypsia, polyuria, heat/cold intolerance  Hematologic: no bleeding, bruising, petechiae, or adenopathy  Immune:  no rhinitis, atopy, immunocompromise, HIV, or cancer    PHYSICAL EXAM  Vital Signs Last 24 Hrs  T(C): 36.4 (24 Jan 2018 11:58), Max: 37.2 (23 Jan 2018 16:17)  T(F): 97.6 (24 Jan 2018 11:58), Max: 98.9 (23 Jan 2018 16:17)  HR: 90 (24 Jan 2018 12:18) (82 - 110)  BP: 168/98 (24 Jan 2018 12:18) (137/89 - 189/148)  BP(mean): --  RR: 18 (24 Jan 2018 11:58) (18 - 18)  SpO2: 97% (24 Jan 2018 11:58) (95% - 98%)  General:    Head:  normocephalic & atraumatic  Eyes:  extra-occular movement is intact  Pupils:  __3_ mm, symmetric, reactive to light  Ear, nose, throat:  mucosa is moist  Neck:  supple  Respiratory:  __normal__ effort, clear to auscultation bilaterally, no rales/ronchi/wheezing  Cardiac:  rate is__normal__, normal rhythm____, no rubs/murmurs/gallops  Abdomen:  Soft, nondistended, nontender, +bowel sounds, no organomegaly  :  deferred  Skin:  dry, pink, normal turgor  Neurologic:  __no_Clonus, __no_ Tremor, Reflexes are___2+__, extremities are supple, cranial nerves II-XII intact, Level of consciousness is_alert___  Psychiatric:  Insight is_moderate___, alert and oriented x_3___, Memory is _intact____, Affect is___mildly hypervigilant__    SIGNIFICANT LABORATORY STUDIES:                        15.0   11.5  )-----------( 376      ( 22 Jan 2018 19:02 )             42.9       01-22    137  |  98  |  8   ----------------------------<  103<H>  3.6   |  22  |  0.76    Ca    9.9      22 Jan 2018 19:02  Phos  2.2     01-22  Mg     2.0     01-22    TPro  8.4<H>  /  Alb  4.9  /  TBili  0.4  /  DBili  x   /  AST  23  /  ALT  19  /  AlkPhos  71  01-22                EKG sinus @125, , QRS 72, QTc 458 MEDICAL TOXICOLOGY CONSULT    HPI: 37F w/anxiety, depression, frequent urticaria p/w palpitations/chest tightness. Was admitted for "hypertensive urgency". Toxicology was consulted for possible anticholinergic toxicity given patient's increasing paranoia, tachycardia/palpitations and possible mydriasis in the setting of taking 7-10 tabs of 25mg diphenhydramine daily for 1-2 years (broken up over the course of many hours); discontinued 1 week ago. Transitioned to 50mg hydroxyzine but hasn't taken since admission. Patient also has been taking xanax and was transitioned to valium; at first 10mg in the AM now 5mg in the AM 2.5m in the PM. She was started on propranolol in conjunction with cardiology for htn and received 50mg IV diphenhydramine late last night for insomnia. Patient has chronic insomnia. She is also on zolpidem and sertraline and denies other ingestions, medication misuse and drug abuse. Had diaphoresis localized to neck on admission, none since. Denies dry skin, urinary retention, hallucinations, tremor, seizures. +significant anxiety and panic attacks. No SI/HI, AH/VH.    ONSET / TIME of exposure(s): last dose 1 week ago    QUANTITY of exposure(s): 175-250mg daily    ROUTE of exposure:  ingestion    CONTEXT of exposure: at home    ASSOCIATED symptoms: palpitations, tachycardia, paranoia    PAST MEDICAL & SURGICAL HISTORY:  Hypertension  No significant past surgical history        MEDICATION HISTORY:  buprenorphine 2 mG/naloxone 0.5 mG SL  Tablet 2 Tablet(s) SubLingual daily  cloNIDine 0.2 milliGRAM(s) Oral at bedtime  diazepam    Tablet 2 milliGRAM(s) Oral at bedtime  influenza   Vaccine 0.5 milliLiter(s) IntraMuscular once  LORazepam   Injectable 1 milliGRAM(s) IV Push every 6 hours PRN  propranolol 80 milliGRAM(s) Oral three times a day  QUEtiapine 50 milliGRAM(s) Oral at bedtime  sertraline 100 milliGRAM(s) Oral daily  sodium chloride 0.9% lock flush 3 milliLiter(s) IV Push every 8 hours      RECREATIONAL / ETHANOL / SUPPLEMENT USE: drinks socially, none recently; denies drug abuse and is former smoker    SOCIAL Hx:  lives with family,     FAMILY HISTORY: non-contributory      REVIEW OF SYSTEMS:    General:  no fever, chills, malaise, change in weight or fatigue  Eyes:  no blurry vision, double vision, or diminished acuity  ENT:  no tinnitus, decreased acuity, epistaxis, sore throat, dysphagia  Cardiac: +chest pain, syncope, or palpitations  Lung:  no cough, +shortness of breath, no stridor, or wheezing  GI:  no abdominal pain, nausea, vomiting, +diarrhea, noconstipation  Genitourinary: no dysuria, hematuria, or incontinence  Ortho: no joint pain, swelling, myalgias, atrophy, or spasm  Skin: +rash, lesions, or pruritis  Neuro:  no headache, weakness/numbness, ataxia, change in speech, dizziness, tremor, or seizure  Psych: __+__depression, _+___anxiety, _no___mania, ___no__suicidal, __no__homicidal  Endocrine: no polydypsia, polyuria, heat/cold intolerance  Hematologic: no bleeding, bruising, petechiae, or adenopathy  Immune:  no rhinitis, atopy, immunocompromise, HIV, or cancer    PHYSICAL EXAM  Vital Signs Last 24 Hrs  T(C): 36.4 (24 Jan 2018 11:58), Max: 37.2 (23 Jan 2018 16:17)  T(F): 97.6 (24 Jan 2018 11:58), Max: 98.9 (23 Jan 2018 16:17)  HR: 90 (24 Jan 2018 12:18) (82 - 110)  BP: 168/98 (24 Jan 2018 12:18) (137/89 - 189/148)  BP(mean): --  RR: 18 (24 Jan 2018 11:58) (18 - 18)  SpO2: 97% (24 Jan 2018 11:58) (95% - 98%)  General:    Head:  normocephalic & atraumatic  Eyes:  extra-occular movement is intact  Pupils:  __3_ mm, symmetric, reactive to light  Ear, nose, throat:  mucosa is moist  Neck:  supple  Respiratory:  __normal__ effort, clear to auscultation bilaterally, no rales/ronchi/wheezing  Cardiac:  rate is__normal__, normal rhythm____, no rubs/murmurs/gallops  Abdomen:  Soft, nondistended, nontender, +bowel sounds, no organomegaly  :  deferred  Skin:  dry, pink, normal turgor  Neurologic:  __no_Clonus, __no_ Tremor, Reflexes are___2+__, extremities are supple, cranial nerves II-XII intact, Level of consciousness is_alert___  Psychiatric:  Insight is_moderate___, alert and oriented x_3___, Memory is _intact____, Affect is___mildly hypervigilant__    SIGNIFICANT LABORATORY STUDIES:                        15.0   11.5  )-----------( 376      ( 22 Jan 2018 19:02 )             42.9       01-22    137  |  98  |  8   ----------------------------<  103<H>  3.6   |  22  |  0.76    Ca    9.9      22 Jan 2018 19:02  Phos  2.2     01-22  Mg     2.0     01-22    TPro  8.4<H>  /  Alb  4.9  /  TBili  0.4  /  DBili  x   /  AST  23  /  ALT  19  /  AlkPhos  71  01-22                EKG sinus @125, , QRS 72, QTc 458

## 2018-01-24 NOTE — DISCHARGE NOTE ADULT - CARE PROVIDER_API CALL
Gaby Jain, Psychiatrist  [address] [place] [address]  Phone: [phone]  Fax: (       - Gaby Jain, Psychiatrist  [address] [place]  [address]  Phone: [phone]  Fax: (   )    -    Jv Bales), Cardiovascular Disease; Internal Medicine  [address] [id number]  Phone: [phone]  Fax: [phone]

## 2018-01-24 NOTE — CONSULT NOTE ADULT - ATTENDING COMMENTS
37 y.o. F Very Pleasant Woman- h.o. Charted but unlikelyAutoimmune Disease, Idiopathic Craniofacial Erythema, Prior Opioid Use (Stopped 15 years ago) on Suboxine, HTN, Tachycardia - p/w palpitations.   Patient recently with change from Xanax to Valium with worsening symptoms. On Clonidine for anxiety and BP. Ruled out for PE. Remains hypertensive and tachycardic.     Impression:  Anxiety with medication changes    Plan:  - Start Propanolol immediate release 40mg TID  - Optimize anti-anxiety medications  - Will send off Pheo and Tristen, but not pursue renal dopplers at this time  - Tele  - TTE   - Re-assurance - very pleasant woman  - [phone]
I have seen and examined the patient.  I agree with the assessment and recommendations made by Dr. Antony.    The pt is a 38 yo female with anxiety/depression, insomnia, urticaria, regularly on antihistamines until 1 week ago when she discontinued, as well as benzodiazepines daily (recent dosing changes but continuing to take daily).  Toxicology called due to concern for possible anticholinergic toxidrome.  No urinary retention, dry skin, delirium, or other overt signs/symptoms of anticholinergic toxicity.  On exam pt overall well appearing, heart RRR, lungs CTAB, abd NTND, extremities without swelling, strength 5/5 in all extremities and skin without rash.  PERRLA 3 mm bilaterally with EOMI.  No clonus, rigidity, hyperreflexia or tremors.     1.) No signs of anticholinergic toxidrome.  Unlikely to have anticholinergic toxidrome 1 week after stopping daily antihistamine use, now on lower dosing in hospital.  Can continue antihistamines as per primary team.  2.) No signs of benzodiazepine withdrawal.  Can continue benzodiazepine dosing as per primary team.  3.) Would look for non-toxicologic cause of patient's symptoms.  4.) Cleared from toxicologic perspective.    Please page with any questions: [phone].    Thank you,  Yo Telles MD  Toxicology Attending

## 2018-01-24 NOTE — PROGRESS NOTE ADULT - SUBJECTIVE AND OBJECTIVE BOX
Patient is a 37y old  Female who presents with a chief complaint of hypertensive urgency (24 Jan 2018 10:42)      SUBJECTIVE / OVERNIGHT EVENTS:  much more calm and comfortable today.  HR and BP controlled.  I spoke with psych attg.  stable for discharge.    MEDICATIONS  (STANDING):  buprenorphine 2 mG/naloxone 0.5 mG SL  Tablet 2 Tablet(s) SubLingual daily  cloNIDine 0.2 milliGRAM(s) Oral at bedtime  diazepam    Tablet 2 milliGRAM(s) Oral at bedtime  influenza   Vaccine 0.5 milliLiter(s) IntraMuscular once  propranolol 80 milliGRAM(s) Oral three times a day  sertraline 100 milliGRAM(s) Oral daily  sodium chloride 0.9% lock flush 3 milliLiter(s) IV Push every 8 hours    MEDICATIONS  (PRN):  LORazepam   Injectable 0.5 milliGRAM(s) IV Push every 6 hours PRN Anxiety  zolpidem 5 milliGRAM(s) Oral at bedtime PRN Insomnia  zolpidem 5 milliGRAM(s) Oral at bedtime PRN Insomnia      Vital Signs Last 24 Hrs  T(C): 36.4 (24 Jan 2018 11:58), Max: 37.2 (23 Jan 2018 16:17)  T(F): 97.6 (24 Jan 2018 11:58), Max: 98.9 (23 Jan 2018 16:17)  HR: 90 (24 Jan 2018 12:18) (82 - 110)  BP: 168/98 (24 Jan 2018 12:18) (137/89 - 189/148)  BP(mean): --  RR: 18 (24 Jan 2018 11:58) (18 - 18)  SpO2: 97% (24 Jan 2018 11:58) (95% - 98%)  CAPILLARY BLOOD GLUCOSE        I&O's Summary    23 Jan 2018 07:01  -  24 Jan 2018 07:00  --------------------------------------------------------  IN: 0 mL / OUT: 500 mL / NET: -500 mL    24 Jan 2018 07:01  -  24 Jan 2018 12:56  --------------------------------------------------------  IN: 720 mL / OUT: 750 mL / NET: -30 mL        PHYSICAL EXAM:  GENERAL: NAD, well-developed  HEAD:  Atraumatic, Normocephalic  EYES: EOMI, PERRLA, conjunctiva and sclera clear  NECK: Supple, No JVD  CHEST/LUNG: Clear to auscultation bilaterally; No wheeze  HEART: Regular rate and rhythm; No murmurs, rubs, or gallops  ABDOMEN: Soft, Nontender, Nondistended; Bowel sounds present  EXTREMITIES:  2+ Peripheral Pulses, No clubbing, cyanosis, or edema  PSYCH: AAOx3  NEUROLOGY: non-focal  SKIN: No rashes or lesions    LABS:                        15.0   11.5  )-----------( 376      ( 22 Jan 2018 19:02 )             42.9     01-22    137  |  98  |  8   ----------------------------<  103<H>  3.6   |  22  |  0.76    Ca    9.9      22 Jan 2018 19:02  Phos  2.2     01-22  Mg     2.0     01-22    TPro  8.4<H>  /  Alb  4.9  /  TBili  0.4  /  DBili  x   /  AST  23  /  ALT  19  /  AlkPhos  71  01-22      CARDIAC MARKERS ( 23 Jan 2018 02:56 )  x     / <0.01 ng/mL / x     / x     / x      CARDIAC MARKERS ( 22 Jan 2018 19:02 )  x     / <0.01 ng/mL / x     / x     / x              RADIOLOGY & ADDITIONAL TESTS:    Imaging Personally Reviewed:    Consultant(s) Notes Reviewed:      Care Discussed with Consultants/Other Providers:

## 2018-01-24 NOTE — DISCHARGE NOTE ADULT - MEDICATION SUMMARY - MEDICATIONS TO TAKE
I will START or STAY ON the medications listed below when I get home from the hospital:    Suboxone 8 mg-2 mg sublingual film  -- 0.5 film(s) under tongue once a day  -- Indication: For pain    cloNIDine 0.2 mg oral tablet  -- 1 tab(s) by mouth once a day  -- Indication: For Hypertension    propranolol 80 mg oral tablet  -- 1 tab(s) by mouth 3 times a day  -- Indication: For Hypertension    Valium 5 mg oral tablet  -- 1-1.5 tab(s) by mouth once a day, As Needed  -- Indication: For Anxiety disorder due to general medical condition with panic attack    Zoloft 100 mg oral tablet  -- 1 tab(s) by mouth once a day  -- Indication: For Anxiety disorder due to general medical condition with panic attack    hydrOXYzine pamoate 25 mg oral capsule  -- 1-2 cap(s) by mouth 2 times a day, As needed  -- Indication: For Anxiety disorder due to general medical condition with panic attack    Ambien 10 mg oral tablet  -- 1 tab(s) by mouth once a day (at bedtime)  -- Indication: For insomnia    hydrocortisone butyrate topical  -- Apply on skin to affected area , As Needed  -- Indication: For skin ointment    ferrous sulfate  -- 1 tab(s) by mouth once a day  -- Indication: For supplement    Fish Oil oral capsule  -- 1 cap(s) by mouth once a day  -- Indication: For supplement    Vitamin B12  -- 1 tab(s) by mouth once a day  -- Indication: For supplement    Vitamin C  -- 1 tab(s) by mouth once a day  -- Indication: For supplement    Vitamin D3  -- 1 tab(s) by mouth once a day  -- Indication: For supplement I will START or STAY ON the medications listed below when I get home from the hospital:    Suboxone 8 mg-2 mg sublingual film  -- 0.5 film(s) under tongue once a day  -- Indication: For pain    cloNIDine 0.2 mg oral tablet  -- 1 tab(s) by mouth once a day (at bedtime)  -- Indication: For High blood pressure    Valium 5 mg oral tablet  -- 1-1.5 tab(s) by mouth once a day, As Needed  -- Indication: For Anxiety disorder due to general medical condition with panic attack    Zoloft 100 mg oral tablet  -- 1 tab(s) by mouth once a day  -- Indication: For Anxiety disorder due to general medical condition with panic attack    QUEtiapine 50 mg oral tablet  -- 1 tab(s) by mouth once a day (at bedtime)  -- Indication: For Anxiety disorder due to general medical condition with panic attack    Ambien 10 mg oral tablet  -- 1 tab(s) by mouth once a day (at bedtime)  -- Indication: For insomnia    NIFEdipine 30 mg oral tablet, extended release  -- 1 tab(s) by mouth once a day  -- Indication: For High blood pressure, rapid heart rate    ketoconazole 2% topical cream  -- 1 application on skin 4 times a day  -- Indication: For rash    hydrocortisone butyrate topical  -- Apply on skin to affected area , As Needed  -- Indication: For skin ointment    ferrous sulfate  -- 1 tab(s) by mouth once a day  -- Indication: For supplement    benzocaine-menthol 15 mg-3.6 mg mucous membrane lozenge  -- 1 lozenge mucous membrane 5 times a day, As Needed  -- Indication: For sore throat    Fish Oil oral capsule  -- 1 cap(s) by mouth once a day  -- Indication: For supplement    nicotine 4 mg oral transmucosal gum  -- 1 gum chewed every 1 to 2 hours MDD:up to 24 pieces daily  -- Do not take this drug if you are pregnant.  It is very important that you take or use this exactly as directed.  Do not skip doses or discontinue unless directed by your doctor.    -- Indication: For smoke cessation    Vitamin B12  -- 1 tab(s) by mouth once a day  -- Indication: For supplement    Vitamin C  -- 1 tab(s) by mouth once a day  -- Indication: For supplement    Vitamin D3  -- 1 tab(s) by mouth once a day  -- Indication: For supplement I will START or STAY ON the medications listed below when I get home from the hospital:    Suboxone 8 mg-2 mg sublingual film  -- 0.5 film(s) under tongue once a day  -- Indication: For pain    cloNIDine 0.2 mg oral tablet  -- 1 tab(s) by mouth once a day (at bedtime)  -- Indication: For High blood pressure    Valium 5 mg oral tablet  -- 1-1.5 tab(s) by mouth once a day, As Needed  -- Indication: For Anxiety disorder due to general medical condition with panic attack    Zoloft 100 mg oral tablet  -- 1 tab(s) by mouth once a day  -- Indication: For Anxiety disorder due to general medical condition with panic attack    QUEtiapine 50 mg oral tablet  -- 1 tab(s) by mouth once a day (at bedtime)  -- Indication: For Anxiety disorder due to general medical condition with panic attack    Ambien 10 mg oral tablet  -- 1 tab(s) by mouth once a day (at bedtime)  -- Indication: For insomnia    labetalol 100 mg oral tablet  -- 1 tab(s) by mouth 2 times a day  -- Indication: For Hypertension    NIFEdipine 30 mg oral tablet, extended release  -- 1 tab(s) by mouth once a day  -- Indication: For High blood pressure, rapid heart rate    ketoconazole 2% topical cream  -- 1 application on skin 4 times a day  -- Indication: For rash    hydrocortisone butyrate topical  -- Apply on skin to affected area , As Needed  -- Indication: For skin ointment    ferrous sulfate  -- 1 tab(s) by mouth once a day  -- Indication: For supplement    benzocaine-menthol 15 mg-3.6 mg mucous membrane lozenge  -- 1 lozenge mucous membrane 5 times a day, As Needed  -- Indication: For sore throat    Fish Oil oral capsule  -- 1 cap(s) by mouth once a day  -- Indication: For supplement    nicotine 4 mg oral transmucosal gum  -- 1 gum chewed every 1 to 2 hours MDD:up to 24 pieces daily  -- Do not take this drug if you are pregnant.  It is very important that you take or use this exactly as directed.  Do not skip doses or discontinue unless directed by your doctor.    -- Indication: For smoke cessation    Vitamin B12  -- 1 tab(s) by mouth once a day  -- Indication: For supplement    Vitamin C  -- 1 tab(s) by mouth once a day  -- Indication: For supplement    Vitamin D3  -- 1 tab(s) by mouth once a day  -- Indication: For supplement

## 2018-01-24 NOTE — DISCHARGE NOTE ADULT - ADDITIONAL INSTRUCTIONS
Follow-up with your primary care physician and with your primary psychologist and psychiatrist within 1 week. Follow-up with your primary care physician and with your primary psychologist and psychiatrist within 1 week.  Follow-up with Cardiologist  Dr. Bales in 2 weeks. Call for appointment.

## 2018-01-24 NOTE — CHART NOTE - NSCHARTNOTEFT_GEN_A_CORE
Pt seen for request to repeat urine toxicology. Pt states "I want to be with (specimen) when it goes to (lab)" I didn't urinate or go to the bathroom last night, and (they) tested "a sample, which can't be mine because I didn't urinate last night". Pt AOX4, endorses having anxiety and depression, for which she follows a psychologist Vanessa Marie who referred pt to a psychiatrist Dr. Gaby Jain who adjusted Valium. Pt adds that her     Lyssa Adkins  Medicine [id number] Pt seen for request to repeat urine toxicology. Pt states "I want to be with (specimen) when it goes to (lab)" I didn't urinate or go to the bathroom last night, and (they) tested "a sample, which can't be mine because I didn't urinate last night". Pt AOX4, endorses having anxiety and depression, for which she follows a psychologist Vanessa Marie who referred pt to a psychiatrist Dr. Gaby Jain who adjusted Valium. Pt denies Suicidal ideation.  -D/w attending and psyche Dr. Blanco above.   -Per psyche: Resume Valium per home regimen and decrease Ativan to 0.5mg IV prn . No need to repeat urinalysis. Discharge pt when cleared medically, can f/u with her primary psyche.      Lyssa Adkins  Medicine [id number]

## 2018-01-24 NOTE — CONSULT NOTE ADULT - PROBLEM SELECTOR RECOMMENDATION 9
-likely in setting of anxiety/panic attack  -no e/o anticholinergic toxicity or benzodiazepine withdrawal on exam  -would taper from benzodiazepines to avoid withdrawal should the primary team or outpatient providers wish to discontinue them  -would avoid overuse of antihistamines but patient can receive in therapeutic doses  -cleared from a toxicologic perspective -likely in setting of anxiety/panic attack  -no e/o anticholinergic toxicity or benzodiazepine withdrawal on exam  -would taper from benzodiazepines to avoid withdrawal should the primary team or outpatient providers wish to discontinue them  -anxiety and insomnia treatment as per psychiatry and primary team  -hypertension treatment as per medical team and cardiology  -would avoid overuse of antihistamines but patient can receive in therapeutic doses  -cleared from a toxicologic perspective -possibly in setting of anxiety/panic attack  -no e/o anticholinergic toxicity or benzodiazepine withdrawal on exam  -would taper from benzodiazepines to avoid withdrawal should the primary team or outpatient providers wish to discontinue them  -anxiety and insomnia treatment as per psychiatry and primary team  -hypertension treatment as per medical team and cardiology  -would avoid overuse of antihistamines but patient can receive in therapeutic doses  -cleared from a toxicologic perspective

## 2018-01-24 NOTE — DISCHARGE NOTE ADULT - HOSPITAL COURSE
37-year-old woman with history of anxiety and frequent hives who presents to the ED with palpitations. The patient has been having these symptoms for the past 1 week. She has been feeling palpitations occasionally for many years, but they have been exacerbated over the past week. She recently had her valium decreased from 10 mg to 5 mg. Her hives have been going on for several years and have also increased over the past few days. She endorses some anxiety, diarrhea, chest pressure but no nausea/vomiting. She is a former smoker.     symptoms possible withdraw from benzos  psych eval called  start ativan 1 mg q 6 hrs prn  resume zoloft 37-year-old woman with history of anxiety and frequent hives who presents to the ED with palpitations. The patient has been having these symptoms for the past 1 week. She has been feeling palpitations occasionally for many years, but they have been exacerbated over the past week. She recently had her valium decreased from 10 mg to 5 mg. Her hives have been going on for several years and have also increased over the past few days. She endorses some anxiety, diarrhea, chest pressure but no nausea/vomiting. She is a former smoker. Symptoms possible withdrawal  from benzos. Psych eval : Anxiety: continue with anti-anxiety medications. Follow-up with outpatient primary psychiatrist. HTN and palpitations improved on propanolol and clonidine.  Pt stable for discharge today. Follow-up with PCP. 37-year-old woman with history of anxiety and frequent hives who presents to the ED with palpitations. The patient has been having these symptoms for the past 1 week. She has been feeling palpitations occasionally for many years, but they have been exacerbated over the past week. She recently had her valium decreased from 10 mg to 5 mg. Her hives have been going on for several years and have also increased over the past few days. She endorses some anxiety, diarrhea, chest pressure but no nausea/vomiting. She is a former smoker. Symptoms possible withdrawal  from benzos. Psych eval : Anxiety: continue with anti-anxiety medications. Head CT negative. Follow-up with outpatient primary psychiatrist. HTN and palpitations improved on Nifedipine and clonidine.  Pt stable for discharge today. Follow-up with PCP. 37-year-old woman with history of anxiety and frequent hives who presents to the ED with palpitations. The patient has been having these symptoms for the past 1 week. She has been feeling palpitations occasionally for many years, but they have been exacerbated over the past week. She recently had her valium decreased from 10 mg to 5 mg. Her hives have been going on for several years and have also increased over the past few days. She endorses some anxiety, diarrhea, chest pressure but no nausea/vomiting. She is a former smoker. Symptoms possible withdrawal  from benzos. Psych eval : Anxiety: continue with anti-anxiety medications. Head CT negative. Follow-up with outpatient primary psychiatrist. HTN and palpitations improved on Nifedipine and clonidine.  Pt stable for discharge today. Follow-up with PCP , psychiatrist and cardiologist Dr. Bales .

## 2018-01-24 NOTE — DISCHARGE NOTE ADULT - CARE PROVIDERS DIRECT ADDRESSES
,DirectAddress_Unknown ,DirectAddress_Unknown,[email or web address]

## 2018-01-24 NOTE — PROGRESS NOTE BEHAVIORAL HEALTH - NSBHCONSULTRECOMMENDOTHER_PSY_A_CORE FT
1) Resume home medication with Diazepam 5mg PO qAM and 2.5mg PO qHS    2) Continue Zolpidem 10mg PO qHS.   3) Continue Zoloft 100mg qday.   4)  Pt at home with 0.5 Film of 8mg suboxone daily at noon, can be simplified to a 4mg-1mg Film daily at noon.   5) May continue clonidine 0.2mg once daily unless adjusted by cardiology.   6) HTN/Tachy management as per primary team.   7) Discussed plan with patient who agrees.  8) Defer hydroxyzine dose to primary team - pt taking around 25-50 mg daily  9) consider lowering ativan PRN to 0.5 mg Q6hr PRN  10) no need for inpt psychiatric admission; pt to continue with her outside provider for ongoing medication management, her NP

## 2018-01-24 NOTE — PROGRESS NOTE ADULT - SUBJECTIVE AND OBJECTIVE BOX
==============================================================  *****************CARDIOLOGY PROGRESS NOTE********************  ==============================================================    REFERRING PHYSICIAN: Mehran Browne    REASON FOR CONSULT: HTN Urgency, Palpitations     CHIEF COMPLAINT:   Patient is a 37y old  Female who presents with a chief complaint of hypertensive urgency (24 Jan 2018 10:42)      BRIEF SUMMARY:  37 y.o. F Very Pleasant Woman- h.o. Charted but unlikelyAutoimmune Disease, Idiopathic Craniofacial Erythema, Prior Opioid Use (Stopped 15 years ago) on Suboxine, HTN, Tachycardia - p/w palpitations.   Patient recently with change from Xanax to Valium with worsening symptoms. On Clonidine for anxiety and BP. Ruled out for PE. Remains hypertensive and tachycardic.     ================================================  24 HR EVENTS:  - Agitated behavior  - Still hypertensive   ================================================  Allergies    Ceclor (Hives)  erythromycin (Hives)    Intolerances    	    MEDICATIONS:  MEDICATIONS  (STANDING):  buprenorphine 2 mG/naloxone 0.5 mG SL  Tablet 2 Tablet(s) SubLingual daily  cloNIDine 0.2 milliGRAM(s) Oral at bedtime  diazepam    Tablet 2 milliGRAM(s) Oral at bedtime  influenza   Vaccine 0.5 milliLiter(s) IntraMuscular once  propranolol 80 milliGRAM(s) Oral three times a day  QUEtiapine 50 milliGRAM(s) Oral at bedtime  sertraline 100 milliGRAM(s) Oral daily  sodium chloride 0.9% lock flush 3 milliLiter(s) IV Push every 8 hours    PAST MEDICAL & SURGICAL HISTORY:  Hypertension  No significant past surgical history      FAMILY HISTORY:    NC - Mother/Father    REVIEW OF SYSTEMS: (Unless + Sign Before Symptom, It is Negative)  Constitutional: No Fever, Fatigue, Weight Changes  Eyes: No recent vision changes, eye pain  ENT: No Congestion, Sore Throat  Endocrine: No Excess Sweating, Temperature Intolerance  Cardiovascular: No Chest Pain, +Palpitations, SOB, Pre-syncope, Syncope  Respiratory: No Cough, Congestion, Wheezing  GI: No dysuria, hematuria  MS: No Joint Pain, Swelling  Neurologic: No Headaches, Imbalance, Focal Deficits  Skin: No Rashes, Hematoma, Prurpura    PHYSICAL EXAM:  T(C): 36.4 (01-24-18 @ 11:58), Max: 37.2 (01-23-18 @ 16:17)  HR: 90 (01-24-18 @ 12:18) (82 - 110)  BP: 168/98 (01-24-18 @ 12:18) (137/89 - 189/148)  RR: 18 (01-24-18 @ 11:58) (18 - 18)  SpO2: 97% (01-24-18 @ 11:58) (95% - 98%)  Wt(kg): --  I&O's Summary    23 Jan 2018 07:01  -  24 Jan 2018 07:00  --------------------------------------------------------  IN: 0 mL / OUT: 500 mL / NET: -500 mL    24 Jan 2018 07:01  -  24 Jan 2018 14:57  --------------------------------------------------------  IN: 720 mL / OUT: 1050 mL / NET: -330 mL        Appearance: Normal; NAD	  HEENT:   NCAT, EOMI	  Cardiovascular: Normal S1 S2, No JVD, No murmurs, No edema  Respiratory: Lungs clear to auscultation, no use of accessory muscles	  Psychiatry: A & O x 3, Mood & affect appropriate  Gastrointestinal:  Soft, Non-tender	  Skin: +erythema on chest   Neurologic: Non-focal, no speech abnormalities  Extremities: Normal range of motion, No clubbing, cyanosis or edema  Vascular: Peripheral pulses palpable 2+ bilaterally, no prominent varicosities      LABS:	  Labs Reviewed 	01-24-18 @ 14:57    CBC Full  -  ( 22 Jan 2018 19:02 )  WBC Count : 11.5 K/uL  Hemoglobin : 15.0 g/dL  Hematocrit : 42.9 %  Platelet Count - Automated : 376 K/uL  Mean Cell Volume : 82.1 fl  Mean Cell Hemoglobin : 28.7 pg  Mean Cell Hemoglobin Concentration : 35.0 gm/dL  Auto Neutrophil # : 8.0 K/uL  Auto Lymphocyte # : 2.8 K/uL  Auto Monocyte # : 0.6 K/uL  Auto Eosinophil # : 0.0 K/uL  Auto Basophil # : 0.0 K/uL  Auto Neutrophil % : 69.7 %  Auto Lymphocyte % : 24.4 %  Auto Monocyte % : 5.1 %  Auto Eosinophil % : 0.3 %  Auto Basophil % : 0.4 %    01-22    137  |  98  |  8   ----------------------------<  103<H>  3.6   |  22  |  0.76    Ca    9.9      22 Jan 2018 19:02  Phos  2.2     01-22  Mg     2.0     01-22    TPro  8.4<H>  /  Alb  4.9  /  TBili  0.4  /  DBili  x   /  AST  23  /  ALT  19  /  AlkPhos  71  01-22      TELEMETRY: 	    	SR/ST  =======================================================================================  =======================================================================================  Assessment:  37 y.o. F Very Pleasant Woman- h.o. Charted but unlikelyAutoimmune Disease, Idiopathic Craniofacial Erythema, Prior Opioid Use (Stopped 15 years ago) on Suboxine, HTN, Tachycardia - p/w palpitations.   Patient recently with change from Xanax to Valium with worsening symptoms. On Clonidine for anxiety and BP. Ruled out for PE. Remains hypertensive and tachycardic.     Impression:  Anxiety with medication changes    Plan:  - Believe this is withdrawal of some type  - Either way, she remains persistently hypertensive  - As there is not much relief in physical manifestations of anxiety from propanolol, would place her back on home dose of Clonidine and add labetalol  - Otherwise, benzos should bring down the adrenergic effects of elevated BP/HR; but defer to psych  - Start Propanolol immediate release 40mg TID      - Will follow.    Please call with questions.      Jv Bales MD [facility]  [phone] ==============================================================  *****************CARDIOLOGY PROGRESS NOTE********************  ==============================================================    REFERRING PHYSICIAN: Mehran Browne    REASON FOR CONSULT: HTN Urgency, Palpitations     CHIEF COMPLAINT:   Patient is a 37y old  Female who presents with a chief complaint of hypertensive urgency (24 Jan 2018 10:42)      BRIEF SUMMARY:  37 y.o. F Very Pleasant Woman- h.o. Charted but unlikelyAutoimmune Disease, Idiopathic Craniofacial Erythema, Prior Opioid Use (Stopped 15 years ago) on Suboxine, HTN, Tachycardia - p/w palpitations.   Patient recently with change from Xanax to Valium with worsening symptoms. On Clonidine for anxiety and BP. Ruled out for PE. Remains hypertensive and tachycardic.     ================================================  24 HR EVENTS:  - Agitated behavior  - Still hypertensive   ================================================  Allergies    Ceclor (Hives)  erythromycin (Hives)    Intolerances    	    MEDICATIONS:  MEDICATIONS  (STANDING):  buprenorphine 2 mG/naloxone 0.5 mG SL  Tablet 2 Tablet(s) SubLingual daily  cloNIDine 0.2 milliGRAM(s) Oral at bedtime  diazepam    Tablet 2 milliGRAM(s) Oral at bedtime  influenza   Vaccine 0.5 milliLiter(s) IntraMuscular once  propranolol 80 milliGRAM(s) Oral three times a day  QUEtiapine 50 milliGRAM(s) Oral at bedtime  sertraline 100 milliGRAM(s) Oral daily  sodium chloride 0.9% lock flush 3 milliLiter(s) IV Push every 8 hours    PAST MEDICAL & SURGICAL HISTORY:  Hypertension  No significant past surgical history      FAMILY HISTORY:    NC - Mother/Father    REVIEW OF SYSTEMS: (Unless + Sign Before Symptom, It is Negative)  Constitutional: No Fever, Fatigue, Weight Changes  Eyes: No recent vision changes, eye pain  ENT: No Congestion, Sore Throat  Endocrine: No Excess Sweating, Temperature Intolerance  Cardiovascular: No Chest Pain, +Palpitations, SOB, Pre-syncope, Syncope  Respiratory: No Cough, Congestion, Wheezing  GI: No dysuria, hematuria  MS: No Joint Pain, Swelling  Neurologic: No Headaches, Imbalance, Focal Deficits  Skin: No Rashes, Hematoma, Prurpura    PHYSICAL EXAM:  T(C): 36.4 (01-24-18 @ 11:58), Max: 37.2 (01-23-18 @ 16:17)  HR: 90 (01-24-18 @ 12:18) (82 - 110)  BP: 168/98 (01-24-18 @ 12:18) (137/89 - 189/148)  RR: 18 (01-24-18 @ 11:58) (18 - 18)  SpO2: 97% (01-24-18 @ 11:58) (95% - 98%)  Wt(kg): --  I&O's Summary    23 Jan 2018 07:01  -  24 Jan 2018 07:00  --------------------------------------------------------  IN: 0 mL / OUT: 500 mL / NET: -500 mL    24 Jan 2018 07:01  -  24 Jan 2018 14:57  --------------------------------------------------------  IN: 720 mL / OUT: 1050 mL / NET: -330 mL        Appearance: Normal; NAD	  HEENT:   NCAT, EOMI	  Cardiovascular: Normal S1 S2, No JVD, No murmurs, No edema  Respiratory: Lungs clear to auscultation, no use of accessory muscles	  Psychiatry: A & O x 3, Mood & affect appropriate  Gastrointestinal:  Soft, Non-tender	  Skin: +erythema on chest   Neurologic: Non-focal, no speech abnormalities  Extremities: Normal range of motion, No clubbing, cyanosis or edema  Vascular: Peripheral pulses palpable 2+ bilaterally, no prominent varicosities      LABS:	  Labs Reviewed 	01-24-18 @ 14:57    CBC Full  -  ( 22 Jan 2018 19:02 )  WBC Count : 11.5 K/uL  Hemoglobin : 15.0 g/dL  Hematocrit : 42.9 %  Platelet Count - Automated : 376 K/uL  Mean Cell Volume : 82.1 fl  Mean Cell Hemoglobin : 28.7 pg  Mean Cell Hemoglobin Concentration : 35.0 gm/dL  Auto Neutrophil # : 8.0 K/uL  Auto Lymphocyte # : 2.8 K/uL  Auto Monocyte # : 0.6 K/uL  Auto Eosinophil # : 0.0 K/uL  Auto Basophil # : 0.0 K/uL  Auto Neutrophil % : 69.7 %  Auto Lymphocyte % : 24.4 %  Auto Monocyte % : 5.1 %  Auto Eosinophil % : 0.3 %  Auto Basophil % : 0.4 %    01-22    137  |  98  |  8   ----------------------------<  103<H>  3.6   |  22  |  0.76    Ca    9.9      22 Jan 2018 19:02  Phos  2.2     01-22  Mg     2.0     01-22    TPro  8.4<H>  /  Alb  4.9  /  TBili  0.4  /  DBili  x   /  AST  23  /  ALT  19  /  AlkPhos  71  01-22      TELEMETRY: 	    	SR/ST  =======================================================================================  =======================================================================================  Assessment:  37 y.o. F Very Pleasant Woman- h.o. Charted but unlikelyAutoimmune Disease, Idiopathic Craniofacial Erythema, Prior Opioid Use (Stopped 15 years ago) on Suboxine, HTN, Tachycardia - p/w palpitations.   Patient recently with change from Xanax to Valium with worsening symptoms. On Clonidine for anxiety and BP. Ruled out for PE. Remains hypertensive and tachycardic.     Impression:  Anxiety with medication changes    Plan:  - Believe this is withdrawal of some type; as we can not identify what it is  - I am reluctant to treat with antihypertensives, but will at this point, do so as I do not see the end point and she is technically in hypertensive urgency.   - Otherwise, treatment of benzos/benadryl withdrawal should bring down the adrenergic effects of elevated BP/HR; but defer to psych  - Change Propanolol to 20 TID  - Start Nifedipine 60 XL  - Will follow.    Please call with questions.      Jv Bales MD [facility]  [phone]

## 2018-01-24 NOTE — DISCHARGE NOTE ADULT - PLAN OF CARE
Improved. Low salt diet  Activity as tolerated.  Take all medication as prescribed.  Follow up with your medical doctor for routine blood pressure monitoring at your next visit.  Notify your doctor if you have any of the following symptoms:   Dizziness, Lightheadedness, Blurry vision, Headache, Chest pain, Shortness of breath Continue with your antianxiety regimen  as prescribed by your doctor.  Follow-up with your primary care physician and with your primary psychologist and psychiatrist within 1 week. Continue with anti-hypertensive medications as prescribed.   Monitor your blood pressure. Call cardiologist and or return to ER if your blood pressure remains greater than 160/95 three times consecutively despite your anti-hypertensive medications.   Follow-up with Cardiologist  Dr. Bales in 2 weeks. Call for appointment.   Maintain low salt diet, Activity as tolerated.  Take all medication as prescribed.  Follow up with your medical doctor for routine blood pressure monitoring at your next visit.  Notify your doctor if you have any of the following symptoms:   Dizziness, Lightheadedness, Blurry vision, Headache, Chest pain, Shortness of breath

## 2018-01-24 NOTE — DISCHARGE NOTE ADULT - PROVIDER TOKENS
FREE:[LAST:[Gaby Jain],FIRST:[Psychiatrist],PHONE:[phone]],FAX:[(   )    -],ADDRESS:[[facility] [place] [address]]] FREE:[LAST:[Gaby Jain],FIRST:[Psychiatrist],PHONE:[phone]],FAX:[(   )    -],ADDRESS:[[address] [place] [address]]],TOKEN:'[id number]'

## 2018-01-24 NOTE — DISCHARGE NOTE ADULT - MEDICATION SUMMARY - MEDICATIONS TO STOP TAKING
I will STOP taking the medications listed below when I get home from the hospital:  None I will STOP taking the medications listed below when I get home from the hospital:    hydrOXYzine pamoate 25 mg oral capsule  -- 1-2 cap(s) by mouth 2 times a day, As needed

## 2018-01-24 NOTE — DISCHARGE NOTE ADULT - CARE PLAN
Principal Discharge DX:	Hypertensive urgency  Goal:	Improved.  Assessment and plan of treatment:	Low salt diet  Activity as tolerated.  Take all medication as prescribed.  Follow up with your medical doctor for routine blood pressure monitoring at your next visit.  Notify your doctor if you have any of the following symptoms:   Dizziness, Lightheadedness, Blurry vision, Headache, Chest pain, Shortness of breath  Secondary Diagnosis:	Anxiety disorder due to general medical condition with panic attack  Assessment and plan of treatment:	Continue with your antianxiety regimen  as prescribed by your doctor.  Follow-up with your primary care physician and with your primary psychologist and psychiatrist within 1 week. Principal Discharge DX:	Hypertensive urgency  Goal:	Improved.  Assessment and plan of treatment:	Continue with anti-hypertensive medications as prescribed.   Monitor your blood pressure. Call cardiologist and or return to ER if your blood pressure remains greater than 160/95 three times consecutively despite your anti-hypertensive medications.   Follow-up with Cardiologist  Dr. Bales in 2 weeks. Call for appointment.   Maintain low salt diet, Activity as tolerated.  Take all medication as prescribed.  Follow up with your medical doctor for routine blood pressure monitoring at your next visit.  Notify your doctor if you have any of the following symptoms:   Dizziness, Lightheadedness, Blurry vision, Headache, Chest pain, Shortness of breath  Secondary Diagnosis:	Anxiety disorder due to general medical condition with panic attack  Assessment and plan of treatment:	Continue with your antianxiety regimen  as prescribed by your doctor.  Follow-up with your primary care physician and with your primary psychologist and psychiatrist within 1 week.

## 2018-01-24 NOTE — DISCHARGE NOTE ADULT - PATIENT PORTAL LINK FT
“You can access the Follow[employer] Patient Portal, offered by [facility], by registering with the following website: [email or web address]”

## 2018-01-25 PROCEDURE — 99232 SBSQ HOSP IP/OBS MODERATE 35: CPT

## 2018-01-25 PROCEDURE — 99233 SBSQ HOSP IP/OBS HIGH 50: CPT

## 2018-01-25 RX ORDER — NIFEDIPINE 30 MG
30 TABLET, EXTENDED RELEASE 24 HR ORAL ONCE
Qty: 0 | Refills: 0 | Status: COMPLETED | OUTPATIENT
Start: 2018-01-25 | End: 2018-01-25

## 2018-01-25 RX ORDER — BENZOCAINE AND MENTHOL 5; 1 G/100ML; G/100ML
1 LIQUID ORAL
Qty: 0 | Refills: 0 | Status: DISCONTINUED | OUTPATIENT
Start: 2018-01-25 | End: 2018-01-26

## 2018-01-25 RX ORDER — NIFEDIPINE 30 MG
30 TABLET, EXTENDED RELEASE 24 HR ORAL DAILY
Qty: 0 | Refills: 0 | Status: DISCONTINUED | OUTPATIENT
Start: 2018-01-25 | End: 2018-01-26

## 2018-01-25 RX ORDER — DIPHENHYDRAMINE HCL 50 MG
25 CAPSULE ORAL ONCE
Qty: 0 | Refills: 0 | Status: COMPLETED | OUTPATIENT
Start: 2018-01-25 | End: 2018-01-25

## 2018-01-25 RX ORDER — KETOCONAZOLE 20 MG/G
1 AEROSOL, FOAM TOPICAL
Qty: 0 | Refills: 0 | Status: DISCONTINUED | OUTPATIENT
Start: 2018-01-25 | End: 2018-01-26

## 2018-01-25 RX ORDER — ACETAMINOPHEN 500 MG
650 TABLET ORAL ONCE
Qty: 0 | Refills: 0 | Status: COMPLETED | OUTPATIENT
Start: 2018-01-25 | End: 2018-01-25

## 2018-01-25 RX ORDER — NICOTINE POLACRILEX 2 MG
4 GUM BUCCAL
Qty: 0 | Refills: 0 | Status: DISCONTINUED | OUTPATIENT
Start: 2018-01-25 | End: 2018-01-26

## 2018-01-25 RX ORDER — NIFEDIPINE 30 MG
30 TABLET, EXTENDED RELEASE 24 HR ORAL DAILY
Qty: 0 | Refills: 0 | Status: DISCONTINUED | OUTPATIENT
Start: 2018-01-25 | End: 2018-01-25

## 2018-01-25 RX ADMIN — Medication 650 MILLIGRAM(S): at 03:57

## 2018-01-25 RX ADMIN — Medication 25 MILLIGRAM(S): at 03:58

## 2018-01-25 RX ADMIN — Medication 1 MILLIGRAM(S): at 23:50

## 2018-01-25 RX ADMIN — Medication 1 MILLIGRAM(S): at 22:27

## 2018-01-25 RX ADMIN — Medication 650 MILLIGRAM(S): at 05:00

## 2018-01-25 RX ADMIN — Medication 1 MILLIGRAM(S): at 17:29

## 2018-01-25 RX ADMIN — Medication 20 MILLIGRAM(S): at 10:41

## 2018-01-25 RX ADMIN — Medication 5 MILLIGRAM(S): at 10:41

## 2018-01-25 RX ADMIN — Medication 0.2 MILLIGRAM(S): at 21:08

## 2018-01-25 RX ADMIN — SERTRALINE 100 MILLIGRAM(S): 25 TABLET, FILM COATED ORAL at 17:12

## 2018-01-25 RX ADMIN — QUETIAPINE FUMARATE 50 MILLIGRAM(S): 200 TABLET, FILM COATED ORAL at 21:43

## 2018-01-25 RX ADMIN — Medication 60 MILLIGRAM(S): at 10:41

## 2018-01-25 RX ADMIN — BUPRENORPHINE AND NALOXONE 2 TABLET(S): 2; .5 TABLET SUBLINGUAL at 11:40

## 2018-01-25 RX ADMIN — KETOCONAZOLE 1 APPLICATION(S): 20 AEROSOL, FOAM TOPICAL at 17:13

## 2018-01-25 RX ADMIN — Medication 2 MILLIGRAM(S): at 21:08

## 2018-01-25 RX ADMIN — BUPRENORPHINE AND NALOXONE 2 TABLET(S): 2; .5 TABLET SUBLINGUAL at 12:30

## 2018-01-25 RX ADMIN — SODIUM CHLORIDE 3 MILLILITER(S): 9 INJECTION INTRAMUSCULAR; INTRAVENOUS; SUBCUTANEOUS at 21:08

## 2018-01-25 NOTE — PROVIDER CONTACT NOTE (OTHER) - BACKGROUND
Pt admitted to unit with chest pain and hypertensive urgency
Pt admitted to unit with chest pain and hypertensive urgency.
Pt admitted to unit with hypertensive urgency and anxiety
Patient admitted to unit with hypertensive urgency and anxiety

## 2018-01-25 NOTE — PROGRESS NOTE ADULT - SUBJECTIVE AND OBJECTIVE BOX
Patient is a 37y old  Female who presents with a chief complaint of hypertensive urgency (2018 10:42)      SUBJECTIVE / OVERNIGHT EVENTS:  did not take bp meds.  unable to explain why. pt is confused at times and speech is tangential.  was seen by psych and toxicology. case d/w cardiology at length.    MEDICATIONS  (STANDING):  buprenorphine 2 mG/naloxone 0.5 mG SL  Tablet 2 Tablet(s) SubLingual daily  cloNIDine 0.2 milliGRAM(s) Oral at bedtime  diazepam    Tablet 2 milliGRAM(s) Oral at bedtime  diazepam    Tablet 5 milliGRAM(s) Oral <User Schedule>  influenza   Vaccine 0.5 milliLiter(s) IntraMuscular once  NIFEdipine XL 30 milliGRAM(s) Oral daily  QUEtiapine 50 milliGRAM(s) Oral at bedtime  sertraline 100 milliGRAM(s) Oral daily  sodium chloride 0.9% lock flush 3 milliLiter(s) IV Push every 8 hours    MEDICATIONS  (PRN):  benzocaine 15 mG/menthol 3.6 mG Lozenge 1 Lozenge Oral five times a day PRN Sore Throat  LORazepam   Injectable 1 milliGRAM(s) IV Push every 6 hours PRN Anxiety      Vital Signs Last 24 Hrs  T(C): 36.9 (2018 10:28), Max: 37.1 (2018 20:27)  T(F): 98.4 (2018 10:28), Max: 98.7 (2018 20:27)  HR: 85 (2018 10:28) (76 - 102)  BP: 168/118 (2018 10:28) (123/86 - 168/118)  BP(mean): --  RR: 18 (2018 10:28) (18 - 19)  SpO2: 98% (2018 10:28) (97% - 98%)  CAPILLARY BLOOD GLUCOSE      POCT Blood Glucose.: 109 mg/dL (2018 21:13)    I&O's Summary    2018 07:01  -  2018 07:00  --------------------------------------------------------  IN: 960 mL / OUT: 1050 mL / NET: -90 mL    2018 07:01  -  2018 12:56  --------------------------------------------------------  IN: 720 mL / OUT: 0 mL / NET: 720 mL        PHYSICAL EXAM:  GENERAL: NAD, well-developed  HEAD:  Atraumatic, Normocephalic  EYES: EOMI, PERRLA, conjunctiva and sclera clear  NECK: Supple, No JVD  CHEST/LUNG: Clear to auscultation bilaterally; No wheeze  HEART: Regular rate and rhythm; No murmurs, rubs, or gallops  ABDOMEN: Soft, Nontender, Nondistended; Bowel sounds present  EXTREMITIES:  2+ Peripheral Pulses, No clubbing, cyanosis, or edema  PSYCH: AAOx3  NEUROLOGY: non-focal  SKIN: No rashes or lesions    LABS:                Urinalysis Basic - ( 2018 04:41 )    Color: PL Yellow / Appearance: Clear / S.011 / pH: x  Gluc: x / Ketone: Trace  / Bili: Negative / Urobili: Negative   Blood: x / Protein: Negative / Nitrite: Negative   Leuk Esterase: Negative / RBC: 0-2 /HPF / WBC 0-2 /HPF   Sq Epi: x / Non Sq Epi: OCC /HPF / Bacteria: x        RADIOLOGY & ADDITIONAL TESTS:    Imaging Personally Reviewed:    Consultant(s) Notes Reviewed:      Care Discussed with Consultants/Other Providers:

## 2018-01-25 NOTE — PROGRESS NOTE ADULT - ASSESSMENT
37-year-old woman with history of anxiety and frequent hives who presents to the ED with palpitations. The patient has been having these symptoms for the past 1 week. She has been feeling palpitations occasionally for many years, but they have been exacerbated over the past week. She recently had her valium decreased from 10 mg to 5 mg. Her hives have been going on for several years and have also increased over the past few days. She endorses some anxiety, diarrhea, chest pressure but no nausea/vomiting. She is a former smoker.     hypertensive urgency.  unclear the etilogy.  discussed with cardio.  dc propanolol.  start nifedipine.  check metanephrines.  ct chest and echo are normal    agitation and anxiety.   symptoms have improved.  psych eval appreciated.  will no longer follow.  cont with home psych meds  c/w zoloft    pt may be discharged and will follow up with her own psychiatrist once bp is controlled..

## 2018-01-25 NOTE — PROGRESS NOTE ADULT - SUBJECTIVE AND OBJECTIVE BOX
==============================================================  *****************CARDIOLOGY PROGRESS NOTE********************  ==============================================================    REFERRING PHYSICIAN: Mehran Browne    REASON FOR CONSULT: HTN Urgency, Palpitations     CHIEF COMPLAINT:   Patient is a 37y old  Female who presents with a chief complaint of hypertensive urgency (24 Jan 2018 10:42)      BRIEF SUMMARY:  37 y.o. F - h.o. Charted but unlikelyAutoimmune Disease, Idiopathic Craniofacial Erythema, Prior Opioid Use (Stopped 15 years ago) on Suboxine, HTN, Tachycardia - p/w palpitations.   Patient recently with change from Xanax to Valium with worsening symptoms. On Clonidine for anxiety and BP. Ruled out for PE. Remains hypertensive and tachycardic.     ================================================  24 HR EVENTS:  - Still hypertensive/anxious  - Discussed regimen  ================================================  Allergies    Ceclor (Hives)  erythromycin (Hives)    Intolerances    	    MEDICATIONS:  MEDICATIONS  (STANDING):  buprenorphine 2 mG/naloxone 0.5 mG SL  Tablet 2 Tablet(s) SubLingual daily  cloNIDine 0.2 milliGRAM(s) Oral at bedtime  diazepam    Tablet 2 milliGRAM(s) Oral at bedtime  diazepam    Tablet 5 milliGRAM(s) Oral <User Schedule>  influenza   Vaccine 0.5 milliLiter(s) IntraMuscular once  ketoconazole 2% Cream 1 Application(s) Topical four times a day  NIFEdipine XL 30 milliGRAM(s) Oral once  NIFEdipine XL 30 milliGRAM(s) Oral daily  QUEtiapine 50 milliGRAM(s) Oral at bedtime  sertraline 100 milliGRAM(s) Oral daily  sodium chloride 0.9% lock flush 3 milliLiter(s) IV Push every 8 hours    PAST MEDICAL & SURGICAL HISTORY:  Hypertension  No significant past surgical history      FAMILY HISTORY:    NC - Mother/Father    REVIEW OF SYSTEMS: (Unless + Sign Before Symptom, It is Negative)  Constitutional: No Fever, Fatigue, Weight Changes +Anxiegty  Eyes: No recent vision changes, eye pain  ENT: No Congestion, Sore Throat  Endocrine: No Excess Sweating, Temperature Intolerance  Cardiovascular: No Chest Pain, +Palpitations, SOB, Pre-syncope, Syncope  Respiratory: No Cough, Congestion, Wheezing  GI: No dysuria, hematuria  MS: No Joint Pain, Swelling  Neurologic: No Headaches, Imbalance, Focal Deficits  Skin: No Rashes, Hematoma, Prurpura    PHYSICAL EXAM:  Vital Signs Last 24 Hrs  T(C): 36.3 (25 Jan 2018 13:56), Max: 37.1 (24 Jan 2018 20:27)  T(F): 97.4 (25 Jan 2018 13:56), Max: 98.7 (24 Jan 2018 20:27)  HR: 95 (25 Jan 2018 13:56) (76 - 102)  BP: 160/125 (25 Jan 2018 13:56) (123/86 - 168/118)  BP(mean): --  RR: 18 (25 Jan 2018 13:56) (18 - 19)  SpO2: 97% (25 Jan 2018 13:56) (97% - 98%)    Appearance: Normal; NAD	  HEENT:   NCAT, EOMI	  Cardiovascular: Normal S1 S2, No JVD, No murmurs, No edema  Respiratory: Lungs clear to auscultation, no use of accessory muscles	  Psychiatry: A & O x 3, Mood & affect appropriate  Gastrointestinal:  Soft, Non-tender	  Skin: No rash visible today   Neurologic: Non-focal, no speech abnormalities  Extremities: Normal range of motion, No clubbing, cyanosis or edema  Vascular: Peripheral pulses palpable 2+ bilaterally, no prominent varicosities      LABS:	  Labs Reviewed 	01-25-18    CBC Full  -  ( 22 Jan 2018 19:02 )  WBC Count : 11.5 K/uL  Hemoglobin : 15.0 g/dL  Hematocrit : 42.9 %  Platelet Count - Automated : 376 K/uL  Mean Cell Volume : 82.1 fl  Mean Cell Hemoglobin : 28.7 pg  Mean Cell Hemoglobin Concentration : 35.0 gm/dL  Auto Neutrophil # : 8.0 K/uL  Auto Lymphocyte # : 2.8 K/uL  Auto Monocyte # : 0.6 K/uL  Auto Eosinophil # : 0.0 K/uL  Auto Basophil # : 0.0 K/uL  Auto Neutrophil % : 69.7 %  Auto Lymphocyte % : 24.4 %  Auto Monocyte % : 5.1 %  Auto Eosinophil % : 0.3 %  Auto Basophil % : 0.4 %    01-22    137  |  98  |  8   ----------------------------<  103<H>  3.6   |  22  |  0.76    Ca    9.9      22 Jan 2018 19:02  Phos  2.2     01-22  Mg     2.0     01-22    TPro  8.4<H>  /  Alb  4.9  /  TBili  0.4  /  DBili  x   /  AST  23  /  ALT  19  /  AlkPhos  71  01-22      TELEMETRY: 	    	SR/ST  =======================================================================================  =======================================================================================  Assessment:  37 y.o. F Very Pleasant Woman- h.o. Charted but unlikelyAutoimmune Disease, Idiopathic Craniofacial Erythema, Prior Opioid Use (Stopped 15 years ago) on Suboxine, HTN, Tachycardia - p/w palpitations.   Patient recently with change from Xanax to Valium with worsening symptoms. On Clonidine for anxiety and BP. Ruled out for PE. Remains hypertensive and tachycardic.     Impression:  Anxiety with medication changes    Plan:  - Believe this is withdrawal of some type; as we can not identify what it is  - I am reluctant to treat with antihypertensives, but will at this point, do so as I do not see the end point and she is technically in hypertensive urgency.   - Nifedipine 30XL  - Stop Propanolol  - Send off metanephrines  - D/W Dr. Laurent  - Will follow.    Please call with questions.      Jv Bales MD [facility]  [phone]

## 2018-01-25 NOTE — PROVIDER CONTACT NOTE (OTHER) - ACTION/TREATMENT ORDERED:
Cindy Corrales made aware and will come to room and assess patient. Provider made aware of patient's refusal of IV and VS.
CARMINE Torres made aware; as per provider, give pt 20 mg of propanolol now
CARMINE Torres made aware; as per provider, give pt 40 mg of propanolol to compensate for new dose ordered. Continue to monitor patient.
Cindy Corrales made aware. As per provider, do not give pt 20 mg of propanolol and procardia at this time-wait until AM mediations. Give patient 0.2 mg of clonidine and repeat BP in an hour.

## 2018-01-25 NOTE — PROVIDER CONTACT NOTE (OTHER) - SITUATION
Patient is hypertensive
Patient is hypertensive
Pt is hypertensive and was found squatting by bed
Patient had a change in mental status

## 2018-01-25 NOTE — PROVIDER CONTACT NOTE (OTHER) - ASSESSMENT
Patient went from being AOX3 to AOX2. Pt is being non-compliant with telemetry box and is refusing to have VS taken at this time. Patient took out IV and is refusing to have another one placed at this time
/110 and HR 81. Pt has 20 mg of propanolol ordered, open order for  60 mg of procardia, and 0.2 mg of clonidine ordered. Pt is asymptomatic with no C/O CP/palpitations
Pt's BP is 178/96 and HR is 95; physician increased propanolol from 40 mg to 80 mg; pt received 40 mg at 2100. Pt was found squatting by bed and is unable to recall whether or not she fell. Nurse in [place] states she didn't hear a bang.
VS: /108 and HR 91. Pt states that BP is usually between -180. Pt received 1 mg of IVP Ativan an hour ago. Apart from mild anxiety, pt is asymptomatic.

## 2018-01-26 ENCOUNTER — MOBILE ON CALL (OUTPATIENT)
Age: 38
End: 2018-01-26

## 2018-01-26 VITALS
HEART RATE: 119 BPM | RESPIRATION RATE: 18 BRPM | OXYGEN SATURATION: 96 % | DIASTOLIC BLOOD PRESSURE: 108 MMHG | SYSTOLIC BLOOD PRESSURE: 158 MMHG

## 2018-01-26 LAB
AMPHET UR-MCNC: NEGATIVE — SIGNIFICANT CHANGE UP
ANION GAP SERPL CALC-SCNC: 17 MMOL/L — SIGNIFICANT CHANGE UP (ref 5–17)
BARBITURATES, URINE.: NEGATIVE — SIGNIFICANT CHANGE UP
BENZODIAZ UR-MCNC: NEGATIVE — SIGNIFICANT CHANGE UP
BUN SERPL-MCNC: 11 MG/DL — SIGNIFICANT CHANGE UP (ref 7–23)
CALCIUM SERPL-MCNC: 10.2 MG/DL — SIGNIFICANT CHANGE UP (ref 8.4–10.5)
CHLORIDE SERPL-SCNC: 101 MMOL/L — SIGNIFICANT CHANGE UP (ref 96–108)
CO2 SERPL-SCNC: 23 MMOL/L — SIGNIFICANT CHANGE UP (ref 22–31)
COCAINE METAB.OTHER UR-MCNC: NEGATIVE — SIGNIFICANT CHANGE UP
CREAT SERPL-MCNC: 0.93 MG/DL — SIGNIFICANT CHANGE UP (ref 0.5–1.3)
CREATININE, URINE THERAPEUTIC: 13.2 MG/DL — LOW
GLUCOSE SERPL-MCNC: 94 MG/DL — SIGNIFICANT CHANGE UP (ref 70–99)
HCT VFR BLD CALC: 43.3 % — SIGNIFICANT CHANGE UP (ref 34.5–45)
HGB BLD-MCNC: 15 G/DL — SIGNIFICANT CHANGE UP (ref 11.5–15.5)
MCHC RBC-ENTMCNC: 28.4 PG — SIGNIFICANT CHANGE UP (ref 27–34)
MCHC RBC-ENTMCNC: 34.7 GM/DL — SIGNIFICANT CHANGE UP (ref 32–36)
MCV RBC AUTO: 81.9 FL — SIGNIFICANT CHANGE UP (ref 80–100)
METANEPHRINE, PL: 20 PG/ML — SIGNIFICANT CHANGE UP (ref 0–62)
METHADONE UR-MCNC: NEGATIVE — SIGNIFICANT CHANGE UP
METHAQUALONE UR QL: NEGATIVE — SIGNIFICANT CHANGE UP
METHAQUALONE UR-MCNC: NEGATIVE — SIGNIFICANT CHANGE UP
NORMETANEPHRINE, PL: 122 PG/ML — SIGNIFICANT CHANGE UP (ref 0–145)
OPIATES UR-MCNC: NEGATIVE — SIGNIFICANT CHANGE UP
PCP UR-MCNC: NEGATIVE — SIGNIFICANT CHANGE UP
PLATELET # BLD AUTO: 404 K/UL — HIGH (ref 150–400)
POTASSIUM SERPL-MCNC: 3.7 MMOL/L — SIGNIFICANT CHANGE UP (ref 3.5–5.3)
POTASSIUM SERPL-SCNC: 3.7 MMOL/L — SIGNIFICANT CHANGE UP (ref 3.5–5.3)
PROPOXYPH UR QL: NEGATIVE — SIGNIFICANT CHANGE UP
RBC # BLD: 5.29 M/UL — HIGH (ref 3.8–5.2)
RBC # FLD: 13 % — SIGNIFICANT CHANGE UP (ref 10.3–14.5)
SODIUM SERPL-SCNC: 141 MMOL/L — SIGNIFICANT CHANGE UP (ref 135–145)
THC UR QL: NEGATIVE — SIGNIFICANT CHANGE UP
WBC # BLD: 13.5 K/UL — HIGH (ref 3.8–10.5)
WBC # FLD AUTO: 13.5 K/UL — HIGH (ref 3.8–10.5)

## 2018-01-26 PROCEDURE — 93306 TTE W/DOPPLER COMPLETE: CPT

## 2018-01-26 PROCEDURE — 96375 TX/PRO/DX INJ NEW DRUG ADDON: CPT | Mod: XU

## 2018-01-26 PROCEDURE — 99285 EMERGENCY DEPT VISIT HI MDM: CPT | Mod: 25

## 2018-01-26 PROCEDURE — 82962 GLUCOSE BLOOD TEST: CPT

## 2018-01-26 PROCEDURE — 81001 URINALYSIS AUTO W/SCOPE: CPT

## 2018-01-26 PROCEDURE — 83735 ASSAY OF MAGNESIUM: CPT

## 2018-01-26 PROCEDURE — 80053 COMPREHEN METABOLIC PANEL: CPT

## 2018-01-26 PROCEDURE — 93005 ELECTROCARDIOGRAM TRACING: CPT

## 2018-01-26 PROCEDURE — 70450 CT HEAD/BRAIN W/O DYE: CPT

## 2018-01-26 PROCEDURE — 84443 ASSAY THYROID STIM HORMONE: CPT

## 2018-01-26 PROCEDURE — 71046 X-RAY EXAM CHEST 2 VIEWS: CPT

## 2018-01-26 PROCEDURE — 80048 BASIC METABOLIC PNL TOTAL CA: CPT

## 2018-01-26 PROCEDURE — 71275 CT ANGIOGRAPHY CHEST: CPT

## 2018-01-26 PROCEDURE — G0378: CPT

## 2018-01-26 PROCEDURE — 84484 ASSAY OF TROPONIN QUANT: CPT

## 2018-01-26 PROCEDURE — 99233 SBSQ HOSP IP/OBS HIGH 50: CPT

## 2018-01-26 PROCEDURE — 84100 ASSAY OF PHOSPHORUS: CPT

## 2018-01-26 PROCEDURE — 80307 DRUG TEST PRSMV CHEM ANLYZR: CPT

## 2018-01-26 PROCEDURE — 83835 ASSAY OF METANEPHRINES: CPT

## 2018-01-26 PROCEDURE — 85027 COMPLETE CBC AUTOMATED: CPT

## 2018-01-26 PROCEDURE — 82088 ASSAY OF ALDOSTERONE: CPT

## 2018-01-26 PROCEDURE — 96374 THER/PROPH/DIAG INJ IV PUSH: CPT | Mod: XU

## 2018-01-26 PROCEDURE — 99232 SBSQ HOSP IP/OBS MODERATE 35: CPT

## 2018-01-26 RX ORDER — QUETIAPINE FUMARATE 200 MG/1
1 TABLET, FILM COATED ORAL
Qty: 14 | Refills: 0
Start: 2018-01-26 | End: 2018-02-08

## 2018-01-26 RX ORDER — BENZOCAINE AND MENTHOL 5; 1 G/100ML; G/100ML
1 LIQUID ORAL
Qty: 0 | Refills: 0 | COMMUNITY
Start: 2018-01-26

## 2018-01-26 RX ORDER — DOCUSATE SODIUM 100 MG
100 CAPSULE ORAL DAILY
Qty: 0 | Refills: 0 | Status: DISCONTINUED | OUTPATIENT
Start: 2018-01-26 | End: 2018-01-26

## 2018-01-26 RX ORDER — BENZOCAINE AND MENTHOL 5; 1 G/100ML; G/100ML
1 LIQUID ORAL
Qty: 0 | Refills: 0 | DISCHARGE
Start: 2018-01-26

## 2018-01-26 RX ORDER — LABETALOL HCL 100 MG
100 TABLET ORAL
Qty: 0 | Refills: 0 | Status: DISCONTINUED | OUTPATIENT
Start: 2018-01-26 | End: 2018-01-26

## 2018-01-26 RX ORDER — LABETALOL HCL 100 MG
1 TABLET ORAL
Qty: 28 | Refills: 0 | OUTPATIENT
Start: 2018-01-26 | End: 2018-02-08

## 2018-01-26 RX ORDER — KETOCONAZOLE 20 MG/G
1 AEROSOL, FOAM TOPICAL
Qty: 1 | Refills: 0
Start: 2018-01-26 | End: 2018-02-08

## 2018-01-26 RX ORDER — NICOTINE POLACRILEX 2 MG
1 GUM BUCCAL
Qty: 336 | Refills: 0
Start: 2018-01-26 | End: 2018-02-08

## 2018-01-26 RX ORDER — NIFEDIPINE 30 MG
1 TABLET, EXTENDED RELEASE 24 HR ORAL
Qty: 14 | Refills: 0
Start: 2018-01-26 | End: 2018-02-08

## 2018-01-26 RX ORDER — POLYETHYLENE GLYCOL 3350 17 G/17G
17 POWDER, FOR SOLUTION ORAL DAILY
Qty: 0 | Refills: 0 | Status: DISCONTINUED | OUTPATIENT
Start: 2018-01-26 | End: 2018-01-26

## 2018-01-26 RX ORDER — DIPHENHYDRAMINE HCL 50 MG
25 CAPSULE ORAL ONCE
Qty: 0 | Refills: 0 | Status: COMPLETED | OUTPATIENT
Start: 2018-01-26 | End: 2018-01-26

## 2018-01-26 RX ADMIN — SODIUM CHLORIDE 3 MILLILITER(S): 9 INJECTION INTRAMUSCULAR; INTRAVENOUS; SUBCUTANEOUS at 06:38

## 2018-01-26 RX ADMIN — Medication 4 MILLIGRAM(S): at 12:48

## 2018-01-26 RX ADMIN — Medication 100 MILLIGRAM(S): at 17:28

## 2018-01-26 RX ADMIN — Medication 25 MILLIGRAM(S): at 19:14

## 2018-01-26 RX ADMIN — KETOCONAZOLE 1 APPLICATION(S): 20 AEROSOL, FOAM TOPICAL at 17:28

## 2018-01-26 RX ADMIN — KETOCONAZOLE 1 APPLICATION(S): 20 AEROSOL, FOAM TOPICAL at 12:47

## 2018-01-26 RX ADMIN — POLYETHYLENE GLYCOL 3350 17 GRAM(S): 17 POWDER, FOR SOLUTION ORAL at 17:28

## 2018-01-26 RX ADMIN — SERTRALINE 100 MILLIGRAM(S): 25 TABLET, FILM COATED ORAL at 12:47

## 2018-01-26 RX ADMIN — Medication 30 MILLIGRAM(S): at 06:38

## 2018-01-26 RX ADMIN — BENZOCAINE AND MENTHOL 1 LOZENGE: 5; 1 LIQUID ORAL at 10:05

## 2018-01-26 RX ADMIN — BUPRENORPHINE AND NALOXONE 2 TABLET(S): 2; .5 TABLET SUBLINGUAL at 12:47

## 2018-01-26 RX ADMIN — SODIUM CHLORIDE 3 MILLILITER(S): 9 INJECTION INTRAMUSCULAR; INTRAVENOUS; SUBCUTANEOUS at 12:55

## 2018-01-26 RX ADMIN — BUPRENORPHINE AND NALOXONE 2 TABLET(S): 2; .5 TABLET SUBLINGUAL at 13:30

## 2018-01-26 RX ADMIN — Medication 5 MILLIGRAM(S): at 10:01

## 2018-01-26 NOTE — PROGRESS NOTE ADULT - ASSESSMENT
37-year-old woman with history of anxiety and frequent hives who presents to the ED with palpitations. The patient has been having these symptoms for the past 1 week. She has been feeling palpitations occasionally for many years, but they have been exacerbated over the past week. She recently had her valium decreased from 10 mg to 5 mg. Her hives have been going on for several years and have also increased over the past few days. She endorses some anxiety, diarrhea, chest pressure but no nausea/vomiting. She is a former smoker.     hypertensive urgency.  better controlled with nifedipine.  check metanephrines.  ct chest and echo are normal    agitation and anxiety.   symptoms have improved.  psych eval appreciated.  will no longer follow.  cont with home psych meds  c/w zoloft    pt may be discharged and will follow up with her own psychiatrist.

## 2018-01-26 NOTE — PROGRESS NOTE BEHAVIORAL HEALTH - SUMMARY
38 yo  F, unemployed, domiciled with family with history of Anxiety, substance dependence, HTN, Chronic Hives admitted to observation with HTN Urgency and Tachycardia and anxiety.    1) Resume home medication with Diazepam 5mg PO qAM and 2.5mg PO qHS    2) Resume Zolpidem 10mg PO qHS.   3) Continue Zoloft 100mg qday.   4)  Pt at home with 0.5 Film of 8mg suboxone daily at noon, can be simplified to a 4mg-1mg Film daily at noon.   5) May continue clonidine 0.2mg once daily unless adjusted by cardiology.   6) HTN/Tachy management as per primary team.   7) Discussed plan with patient who agrees.  8) Defer hydroxyzine dose to primary team - pt taking around 25-50 mg daily
36 yo  F, unemployed, domiciled with family with history of Anxiety, substance dependence, HTN, Chronic Hives admitted to observation with HTN Urgency and Tachycardia and anxiety. Pt may have been taking high doses of Benadryl at home, pupils highly dilated. Pt being counseled to decrease benadryl/hydroxyzine usage - may be contributing to her oddness at night. Currently has capacity, but this may change depending on her mental status.
36 yo  F, unemployed, domiciled with family with history of Anxiety, substance dependence, HTN, Chronic Hives admitted to observation with HTN Urgency and Tachycardia and anxiety. Pt may have been taking high doses of Benadryl at home, pupils highly dilated. Pt being counseled to decrease benadryl/hydroxyzine usage - may be contributing to her oddness at night. Pt c/o intermittent anxiety, vague hallucinations, illusions. no si/hi.

## 2018-01-26 NOTE — PROGRESS NOTE BEHAVIORAL HEALTH - NSBHCHARTREVIEWINVESTIGATE_PSY_A_CORE FT
Ventricular Rate 118 BPM    Atrial Rate 118 BPM    P-R Interval 140 ms    QRS Duration 76 ms     ms    QTc 468 ms    P Axis 23 degrees    R Axis 23 degrees    T Axis 9 degrees    Diagnosis Line SINUS TACHYCARDIA  OTHERWISE NORMAL ECG

## 2018-01-26 NOTE — PROGRESS NOTE BEHAVIORAL HEALTH - NSBHCHARTREVIEWLAB_PSY_A_CORE FT
15.0   11.5  )-----------( 376      ( 22 Jan 2018 19:02 )             42.9   01-22    137  |  98  |  8   ----------------------------<  103<H>  3.6   |  22  |  0.76    Ca    9.9      22 Jan 2018 19:02  Phos  2.2     01-22  Mg     2.0     01-22    TPro  8.4<H>  /  Alb  4.9  /  TBili  0.4  /  DBili  x   /  AST  23  /  ALT  19  /  AlkPhos  71  01-22
15.0   13.5  )-----------( 404      ( 26 Jan 2018 11:49 )             43.3     01-26    141  |  101  |  11  ----------------------------<  94  3.7   |  23  |  0.93    Ca    10.2      26 Jan 2018 11:49

## 2018-01-26 NOTE — PROGRESS NOTE BEHAVIORAL HEALTH - NSBHCONSULTFOLLOW_PSY_A_CORE
Signing off - call with questions…

## 2018-01-26 NOTE — PROGRESS NOTE BEHAVIORAL HEALTH - NSBHFUPINTERVALHXFT_PSY_A_CORE
Pt seen, chart reviewed, Med team requested psych f/u since pt was requesting to be seen by psych again. Pt wanted to hear further about her differential diagnoses, and  med mgt. Pt reports feeling better today, less anxious, less hallucinations. Pt saw some pieces of paper "moving" on the floor this am. Pt denies AH. Pt denies feeling depressed, no si/hi. Pt took seroquel last night, c/o some grogginess this am.
Pt seen, chart reviewed. RN reports that in early am pt was somewhat paranoid, refusing some of her meds, was more pleasant and appropriate later in the morning. Pt pleasant with writer, joking.  She also feels she was acting somewhat oddly in the early hours. She is reluctant to try Seroquel as her sister gained a lot of weight on it. She understands that her benadryl dose may need to be decreased.
Pt seen, AOA x 3, pt has been compliant with her outpt psychiatric medications, no side effects reported. Staff reports paranoid flavor with patient, as she has been asking to re-do her urine test, not believing her urine came back positive for THC. Pt admits to mild anxiety symptoms, no panic attacks, and no si/hi. She denies feeling hopeless and helpless. She reports fragmented sleep, fair appetite. No AH/VH, no behavioral problems reported.

## 2018-01-26 NOTE — PROGRESS NOTE BEHAVIORAL HEALTH - NSBHCHARTREVIEWIMAGING_PSY_A_CORE FT
EXAM:  CT BRAIN                            PROCEDURE DATE:  01/24/2018            INTERPRETATION:  CT HEAD WITHOUT CONTRAST    INDICATION: Dilated pupils    TECHNIQUE: Noncontrast routine head CT.    COMPARISON: None.    FINDINGS:    Brain: No hemorrhage. No mass effect or edema. No midline shift.   Parenchyma and sulci are age-appropriate.  Ventricles: No evidence of hydrocephalus.    Bones and soft tissues: No acute calvarial fracture.  Sinuses and mastoids: Unremarkable.    IMPRESSION:    Noacute intracranial pathology.

## 2018-01-26 NOTE — PROGRESS NOTE BEHAVIORAL HEALTH - PRIMARY DX
Anxiety disorder due to general medical condition with panic attack

## 2018-01-26 NOTE — CHART NOTE - NSCHARTNOTEFT_GEN_A_CORE
Pt seen and examined for anxiety, Pt seen and examined for anxiety and "seizures." Pt AOX3, sitting on side of bed, mother and aunt at Pt seen and examined for anxiety and "seizures." Pt AOX3, sitting on side of bed, mother and aunt at bedside. Pt Pt seen and examined for anxiety and "seizures." Pt AOX3, sitting on side of bed, mother and aunt at bedside. Pt states she "had seizures overnight, slipped off bed , urinated on floor. (She) was calling out "help me, help me, " while she was having a seizure. Pt also reports she "popped her lung" while she was yelling for help and "coughed up a phlegm" which she saved in a specimen cup. Phlegm in cup auf-white very small amt. Pt also c/o constipation, (+) flatus, last BM 2 days ago. No reported seizures. Family requesting pt to be re-evaluated by psyche but after conversation with Nurse Manager has agreed to take pt home and follow-up with pt's primary psychiatrist as soon as possible. Attending informed, agree with discharge.   147/-72-%SpO2 on RA-98.4F.  HEENT:PERRL, (+) facial symmetry, no oral erythema or mucositis noted.  CV: S1S2, RRR, tachycardic  Lungs: effort normal , clear all fields, no wheezing, no stridor, no rales  Abd: BS(+), soft, NT/ND  Neuro: AOx3, grossly normal    A/P 38 yo  F, unemployed, domiciled with family with history of Anxiety, substance dependence, HTN, Chronic Hives admitted to observation with HTN Urgency and Tachycardia and anxiety. Pt may have been taking high doses of Benadryl at home. Seen by psyche pupils highly dilated. Pt being counseled to decrease benadryl/hydroxyzine usage - may be contributing to her oddness at night. Currently has capacity, but this may change depending on her mental status. Pt seen and examined for anxiety and "seizures." Pt AOX3, sitting on side of bed, mother and aunt at bedside. Pt states she "had seizures overnight, slipped off bed , urinated on floor. (She) was calling out "help me, help me, " while she was having a seizure. Pt also reports she "popped her lung" while she was yelling for help and "coughed up a phlegm" which she saved in a specimen cup. Phlegm in cup auf-white very small amt. Pt also c/o constipation, (+) flatus, last BM 2 days ago. No reported seizures. Family requesting pt to be re-evaluated by psyche but after conversation with Nurse Manager has agreed to take pt home and follow-up with pt's primary psychiatrist as soon as possible. Attending informed, agree with discharge.   147/-79-%SpO2 on RA-98.4F.  HEENT:PERRL, (+) facial symmetry, no oral erythema or mucositis noted.  CV: S1S2, RRR, tachycardic  Lungs: effort normal , clear all fields, no wheezing, no stridor, no rales  Abd: BS(+), soft, NT/ND  Neuro: AOx3, grossly normal    A/P 36 yo  F, unemployed, domiciled with family with history of Anxiety, substance dependence, HTN, Chronic Hives admitted to observation with HTN Urgency and Tachycardia and anxiety. Pt may have been taking high doses of Benadryl at home. Seen by psyche who has  counseled to decrease benadryl/hydroxyzine usage - may be contributing to her oddness at night. Per psyche pt  has capacity. Pt and  mother is agreeable to discharge and f/u with primary psychiatrist .      Lyssa Adkins NP [facility] [id number]

## 2018-01-26 NOTE — CHART NOTE - NSCHARTNOTEFT_GEN_A_CORE
Pt seen for tachycardia 130 and /100. Pt anxious, denies any SOB and chest pain. EKG , no ectopy. D/w attending Dr. Laurent and Dr. MARYANNE Bales who recommended Labetalol 100mg BID, can discharge pt . Have pt monitor her BP at home, if >160/95 x 3 readings return to ER. Otherwise f/u with Dr. Bales in two weeks. Dr. Bales will see pt per pt /family request.       Lyssa Adkins NP Medicine [id number]

## 2018-01-26 NOTE — PROGRESS NOTE BEHAVIORAL HEALTH - NSBHCONSULTFOLLOWAFTERCARE_PSY_A_CORE FT
Follow with outpt psychiatrist NP
pt to f/u with her outside provider for ongoing medication management
Follow with outpt psychiatrist

## 2018-01-26 NOTE — PROGRESS NOTE BEHAVIORAL HEALTH - NSBHCONSULTMEDS_PSY_A_CORE FT
cont seroquel, valium as outpatient
As before. Pt refused Seroquel last night, encouraged to try today, she will consider, but has right to refuse.  Try to limit anticholinergics.

## 2018-01-26 NOTE — CHART NOTE - NSCHARTNOTEFT_GEN_A_CORE
D/w attending re: leukocytosis. Pt afebrile. Cleared by cardiology for discharge. Per psyche: pt has capacity. Discharge pt today. SW informed of family concerns. Pt to f/u her primary psychiatrist.    Lyssa Adkins NP [facility] [id number]

## 2018-01-26 NOTE — PROGRESS NOTE ADULT - SUBJECTIVE AND OBJECTIVE BOX
Patient to be seen midday if still here, otherwise OK to D/C on current regimen from cardiac standpoint ==============================================================  *****************CARDIOLOGY PROGRESS NOTE********************  ==============================================================    REFERRING PHYSICIAN: Mehran Browne    REASON FOR CONSULT: HTN Urgency, Palpitations     CHIEF COMPLAINT:   Patient is a 37y old  Female who presents with a chief complaint of hypertensive urgency (24 Jan 2018 10:42)      BRIEF SUMMARY:  37 y.o. F - h.o. Charted but unlikelyAutoimmune Disease, Idiopathic Craniofacial Erythema, Prior Opioid Use (Stopped 15 years ago) on Suboxine, HTN, Tachycardia - p/w palpitations.   Patient recently with change from Xanax to Valium with worsening symptoms. On Clonidine for anxiety and BP. Ruled out for PE. Remains hypertensive and tachycardic.     ================================================  24 HR EVENTS:  - Still hypertensive/anxious  - Discussed regimen and how it is likely anxiety provoking symptoms  ================================================  Allergies    Ceclor (Hives)  erythromycin (Hives)    Intolerances    	    MEDICATIONS:  MEDICATIONS  (STANDING):  buprenorphine 2 mG/naloxone 0.5 mG SL  Tablet 2 Tablet(s) SubLingual daily  cloNIDine 0.2 milliGRAM(s) Oral at bedtime  diazepam    Tablet 2 milliGRAM(s) Oral at bedtime  diazepam    Tablet 5 milliGRAM(s) Oral <User Schedule>  diphenhydrAMINE   Capsule 25 milliGRAM(s) Oral once  docusate sodium 100 milliGRAM(s) Oral daily  influenza   Vaccine 0.5 milliLiter(s) IntraMuscular once  ketoconazole 2% Cream 1 Application(s) Topical four times a day  labetalol 100 milliGRAM(s) Oral two times a day  NIFEdipine XL 30 milliGRAM(s) Oral daily  QUEtiapine 50 milliGRAM(s) Oral at bedtime  sertraline 100 milliGRAM(s) Oral daily  sodium chloride 0.9% lock flush 3 milliLiter(s) IV Push every 8 hours    PAST MEDICAL & SURGICAL HISTORY:  Hypertension  No significant past surgical history      FAMILY HISTORY:    NC - Mother/Father    REVIEW OF SYSTEMS: (Unless + Sign Before Symptom, It is Negative)  Constitutional: No Fever, Fatigue, Weight Changes +Anxiegty  Eyes: No recent vision changes, eye pain  ENT: No Congestion, Sore Throat  Endocrine: No Excess Sweating, Temperature Intolerance  Cardiovascular: No Chest Pain, +Palpitations, SOB, Pre-syncope, Syncope  Respiratory: No Cough, Congestion, Wheezing  GI: No dysuria, hematuria  MS: No Joint Pain, Swelling  Neurologic: No Headaches, Imbalance, Focal Deficits  Skin: No Rashes, Hematoma, Prurpura    PHYSICAL EXAM:  Vital Signs Last 24 Hrs  T(C): 36.3 (25 Jan 2018 13:56), Max: 37.1 (24 Jan 2018 20:27)  T(F): 97.4 (25 Jan 2018 13:56), Max: 98.7 (24 Jan 2018 20:27)  HR: 95 (25 Jan 2018 13:56) (76 - 102)  BP: 160/125 (25 Jan 2018 13:56) (123/86 - 168/118)  BP(mean): --  RR: 18 (25 Jan 2018 13:56) (18 - 19)  SpO2: 97% (25 Jan 2018 13:56) (97% - 98%)    Appearance: Normal; NAD	  HEENT:   NCAT, EOMI	  Cardiovascular: Normal S1 S2, No JVD, No murmurs, No edema  Respiratory: Lungs clear to auscultation, no use of accessory muscles	  Psychiatry: A & O x 3, Mood & affect appropriate  Gastrointestinal:  Soft, Non-tender	  Skin: No rash visible today   Neurologic: Non-focal, no speech abnormalities  Extremities: Normal range of motion, No clubbing, cyanosis or edema  Vascular: Peripheral pulses palpable 2+ bilaterally, no prominent varicosities      LABS:	  Labs Reviewed 	01-26-18    CBC Full  -  ( 22 Jan 2018 19:02 )  WBC Count : 11.5 K/uL  Hemoglobin : 15.0 g/dL  Hematocrit : 42.9 %  Platelet Count - Automated : 376 K/uL  Mean Cell Volume : 82.1 fl  Mean Cell Hemoglobin : 28.7 pg  Mean Cell Hemoglobin Concentration : 35.0 gm/dL  Auto Neutrophil # : 8.0 K/uL  Auto Lymphocyte # : 2.8 K/uL  Auto Monocyte # : 0.6 K/uL  Auto Eosinophil # : 0.0 K/uL  Auto Basophil # : 0.0 K/uL  Auto Neutrophil % : 69.7 %  Auto Lymphocyte % : 24.4 %  Auto Monocyte % : 5.1 %  Auto Eosinophil % : 0.3 %  Auto Basophil % : 0.4 %    01-22    137  |  98  |  8   ----------------------------<  103<H>  3.6   |  22  |  0.76    Ca    9.9      22 Jan 2018 19:02  Phos  2.2     01-22  Mg     2.0     01-22    TPro  8.4<H>  /  Alb  4.9  /  TBili  0.4  /  DBili  x   /  AST  23  /  ALT  19  /  AlkPhos  71  01-22      TELEMETRY: 	    	SR/ST  =======================================================================================  =======================================================================================  Assessment:  37 y.o. F Very Pleasant Woman- h.o. Charted but unlikelyAutoimmune Disease, Idiopathic Craniofacial Erythema, Prior Opioid Use (Stopped 15 years ago) on Suboxine, HTN, Tachycardia - p/w palpitations.   Patient recently with change from Xanax to Valium with worsening symptoms. On Clonidine for anxiety and BP. Ruled out for PE. Remains hypertensive and tachycardic.     Impression:  Anxiety with medication changes    Plan:  - Believe this is withdrawal of some type; as we can not identify what it is  - I am reluctant to treat with antihypertensives, but will at this point, do so as I do not see the end point and she is technically in hypertensive urgency.   - Nifedipine 30XL  - Clonidine 0.2mg daily  - Stop Propanolol  - F/"U with me as outpatient for 24 hour blood pressure aMBP  - Metanephrines normal  - OK to DC    Please call with questions.      Jv Bales MD [facility]  [phone]

## 2018-01-26 NOTE — PROGRESS NOTE ADULT - SUBJECTIVE AND OBJECTIVE BOX
Patient is a 37y old  Female who presents with a chief complaint of hypertensive urgency (24 Jan 2018 10:42)      SUBJECTIVE / OVERNIGHT EVENTS:  No chest pain. No shortness of breath. No complaints. No events overnight.     MEDICATIONS  (STANDING):  buprenorphine 2 mG/naloxone 0.5 mG SL  Tablet 2 Tablet(s) SubLingual daily  cloNIDine 0.2 milliGRAM(s) Oral at bedtime  diazepam    Tablet 2 milliGRAM(s) Oral at bedtime  diazepam    Tablet 5 milliGRAM(s) Oral <User Schedule>  influenza   Vaccine 0.5 milliLiter(s) IntraMuscular once  ketoconazole 2% Cream 1 Application(s) Topical four times a day  NIFEdipine XL 30 milliGRAM(s) Oral daily  QUEtiapine 50 milliGRAM(s) Oral at bedtime  sertraline 100 milliGRAM(s) Oral daily  sodium chloride 0.9% lock flush 3 milliLiter(s) IV Push every 8 hours    MEDICATIONS  (PRN):  benzocaine 15 mG/menthol 3.6 mG Lozenge 1 Lozenge Oral five times a day PRN Sore Throat  LORazepam   Injectable 1 milliGRAM(s) IV Push every 6 hours PRN Anxiety  nicotine  Polacrilex Gum 4 milliGRAM(s) Oral every 1 hour PRN nicotine addiction      Vital Signs Last 24 Hrs  T(C): 36.9 (26 Jan 2018 06:36), Max: 36.9 (26 Jan 2018 06:36)  T(F): 98.5 (26 Jan 2018 06:36), Max: 98.5 (26 Jan 2018 06:36)  HR: 102 (26 Jan 2018 06:36) (95 - 108)  BP: 145/86 (26 Jan 2018 06:36) (119/74 - 172/99)  BP(mean): --  RR: 17 (26 Jan 2018 06:36) (16 - 18)  SpO2: 100% (26 Jan 2018 06:36) (96% - 100%)  CAPILLARY BLOOD GLUCOSE        I&O's Summary    25 Jan 2018 07:01  -  26 Jan 2018 07:00  --------------------------------------------------------  IN: 720 mL / OUT: 600 mL / NET: 120 mL        PHYSICAL EXAM:  GENERAL: NAD, well-developed  HEAD:  Atraumatic, Normocephalic  EYES: EOMI, PERRLA, conjunctiva and sclera clear  NECK: Supple, No JVD  CHEST/LUNG: Clear to auscultation bilaterally; No wheeze  HEART: Regular rate and rhythm; No murmurs, rubs, or gallops  ABDOMEN: Soft, Nontender, Nondistended; Bowel sounds present  EXTREMITIES:  2+ Peripheral Pulses, No clubbing, cyanosis, or edema  PSYCH: AAOx3  NEUROLOGY: non-focal  SKIN: No rashes or lesions    LABS:                    RADIOLOGY & ADDITIONAL TESTS:    Imaging Personally Reviewed:    Consultant(s) Notes Reviewed:      Care Discussed with Consultants/Other Providers:

## 2018-01-29 ENCOUNTER — OTHER (OUTPATIENT)
Age: 38
End: 2018-01-29

## 2018-01-29 PROBLEM — I10 ESSENTIAL (PRIMARY) HYPERTENSION: Chronic | Status: ACTIVE | Noted: 2018-01-22

## 2018-01-30 LAB
METANEPHRINE, PL: 21 PG/ML — SIGNIFICANT CHANGE UP (ref 0–62)
NORMETANEPHRINE, PL: 145 PG/ML — SIGNIFICANT CHANGE UP (ref 0–145)

## 2018-02-07 ENCOUNTER — MEDICATION RENEWAL (OUTPATIENT)
Age: 38
End: 2018-02-07

## 2018-02-07 ENCOUNTER — NON-APPOINTMENT (OUTPATIENT)
Age: 38
End: 2018-02-07

## 2018-02-07 ENCOUNTER — APPOINTMENT (OUTPATIENT)
Dept: CARDIOLOGY | Facility: CLINIC | Age: 38
End: 2018-02-07
Payer: MEDICAID

## 2018-02-07 VITALS
DIASTOLIC BLOOD PRESSURE: 97 MMHG | OXYGEN SATURATION: 97 % | SYSTOLIC BLOOD PRESSURE: 150 MMHG | HEIGHT: 61 IN | HEART RATE: 96 BPM | BODY MASS INDEX: 42.86 KG/M2 | WEIGHT: 227 LBS

## 2018-02-07 PROCEDURE — 99401 PREV MED CNSL INDIV APPRX 15: CPT

## 2018-02-07 PROCEDURE — 99215 OFFICE O/P EST HI 40 MIN: CPT | Mod: 25

## 2018-02-07 PROCEDURE — 93000 ELECTROCARDIOGRAM COMPLETE: CPT

## 2018-02-07 RX ORDER — LEVOCETIRIZINE DIHYDROCHLORIDE 5 MG/1
5 TABLET ORAL DAILY
Qty: 30 | Refills: 2 | Status: DISCONTINUED | COMMUNITY
Start: 2017-04-07 | End: 2018-02-07

## 2018-02-07 RX ORDER — FERROUS GLUCONATE 256(28)MG
256 (28 FE) TABLET ORAL
Qty: 90 | Refills: 2 | Status: DISCONTINUED | COMMUNITY
Start: 2017-02-07 | End: 2018-02-07

## 2018-02-07 RX ORDER — ALPRAZOLAM 2 MG/1
TABLET ORAL
Refills: 0 | Status: DISCONTINUED | COMMUNITY
End: 2018-02-07

## 2018-02-27 ENCOUNTER — MEDICATION RENEWAL (OUTPATIENT)
Age: 38
End: 2018-02-27

## 2018-04-17 ENCOUNTER — APPOINTMENT (OUTPATIENT)
Dept: CARDIOLOGY | Facility: CLINIC | Age: 38
End: 2018-04-17
Payer: MEDICAID

## 2018-04-17 ENCOUNTER — NON-APPOINTMENT (OUTPATIENT)
Age: 38
End: 2018-04-17

## 2018-04-17 VITALS
HEART RATE: 90 BPM | HEIGHT: 61 IN | WEIGHT: 227 LBS | DIASTOLIC BLOOD PRESSURE: 71 MMHG | SYSTOLIC BLOOD PRESSURE: 106 MMHG | BODY MASS INDEX: 42.86 KG/M2 | OXYGEN SATURATION: 95 %

## 2018-04-17 PROCEDURE — 93000 ELECTROCARDIOGRAM COMPLETE: CPT

## 2018-04-17 PROCEDURE — 99215 OFFICE O/P EST HI 40 MIN: CPT

## 2018-04-17 RX ORDER — NIFEDIPINE 60 MG/1
60 TABLET, EXTENDED RELEASE ORAL
Qty: 90 | Refills: 3 | Status: DISCONTINUED | COMMUNITY
Start: 2018-02-07 | End: 2018-04-17

## 2018-08-10 ENCOUNTER — APPOINTMENT (OUTPATIENT)
Dept: CARDIOLOGY | Facility: CLINIC | Age: 38
End: 2018-08-10
Payer: MEDICAID

## 2018-08-10 ENCOUNTER — NON-APPOINTMENT (OUTPATIENT)
Age: 38
End: 2018-08-10

## 2018-08-10 VITALS — HEART RATE: 85 BPM | OXYGEN SATURATION: 99 % | DIASTOLIC BLOOD PRESSURE: 75 MMHG | SYSTOLIC BLOOD PRESSURE: 109 MMHG

## 2018-08-10 PROCEDURE — 99215 OFFICE O/P EST HI 40 MIN: CPT

## 2018-08-10 PROCEDURE — 93000 ELECTROCARDIOGRAM COMPLETE: CPT

## 2018-09-06 ENCOUNTER — RX RENEWAL (OUTPATIENT)
Age: 38
End: 2018-09-06

## 2018-10-16 ENCOUNTER — RX RENEWAL (OUTPATIENT)
Age: 38
End: 2018-10-16

## 2019-02-11 ENCOUNTER — RX RENEWAL (OUTPATIENT)
Age: 39
End: 2019-02-11

## 2019-03-19 ENCOUNTER — APPOINTMENT (OUTPATIENT)
Dept: CARDIOLOGY | Facility: CLINIC | Age: 39
End: 2019-03-19

## 2019-04-03 ENCOUNTER — APPOINTMENT (OUTPATIENT)
Dept: CARDIOLOGY | Facility: CLINIC | Age: 39
End: 2019-04-03
Payer: MEDICAID

## 2019-04-03 ENCOUNTER — NON-APPOINTMENT (OUTPATIENT)
Age: 39
End: 2019-04-03

## 2019-04-03 VITALS
SYSTOLIC BLOOD PRESSURE: 131 MMHG | WEIGHT: 260 LBS | OXYGEN SATURATION: 97 % | BODY MASS INDEX: 49.13 KG/M2 | DIASTOLIC BLOOD PRESSURE: 87 MMHG | HEART RATE: 93 BPM

## 2019-04-03 DIAGNOSIS — F41.9 ANXIETY DISORDER, UNSPECIFIED: ICD-10-CM

## 2019-04-03 PROCEDURE — 99215 OFFICE O/P EST HI 40 MIN: CPT

## 2019-04-03 PROCEDURE — 93000 ELECTROCARDIOGRAM COMPLETE: CPT

## 2019-05-15 ENCOUNTER — APPOINTMENT (OUTPATIENT)
Dept: CARDIOLOGY | Facility: CLINIC | Age: 39
End: 2019-05-15

## 2019-05-24 ENCOUNTER — RX CHANGE (OUTPATIENT)
Age: 39
End: 2019-05-24

## 2019-05-29 ENCOUNTER — APPOINTMENT (OUTPATIENT)
Dept: CARDIOLOGY | Facility: CLINIC | Age: 39
End: 2019-05-29
Payer: MEDICAID

## 2019-05-29 DIAGNOSIS — R03.0 ELEVATED BLOOD-PRESSURE READING, W/OUT DIAGNOSIS OF HYPERTENSION: ICD-10-CM

## 2019-05-29 PROCEDURE — 93784 AMBL BP MNTR W/SOFTWARE: CPT

## 2019-06-01 PROBLEM — R03.0 WHITE COAT SYNDROME WITHOUT DIAGNOSIS OF HYPERTENSION: Status: ACTIVE | Noted: 2019-06-01

## 2019-06-17 ENCOUNTER — APPOINTMENT (OUTPATIENT)
Dept: OBGYN | Facility: CLINIC | Age: 39
End: 2019-06-17
Payer: MEDICAID

## 2019-06-17 VITALS
BODY MASS INDEX: 37 KG/M2 | WEIGHT: 196 LBS | SYSTOLIC BLOOD PRESSURE: 132 MMHG | HEART RATE: 88 BPM | HEIGHT: 61 IN | DIASTOLIC BLOOD PRESSURE: 80 MMHG | OXYGEN SATURATION: 98 %

## 2019-06-17 DIAGNOSIS — Z91.89 OTHER SPECIFIED PERSONAL RISK FACTORS, NOT ELSEWHERE CLASSIFIED: ICD-10-CM

## 2019-06-17 DIAGNOSIS — Z78.9 OTHER SPECIFIED HEALTH STATUS: ICD-10-CM

## 2019-06-17 PROCEDURE — 99214 OFFICE O/P EST MOD 30 MIN: CPT | Mod: 25

## 2019-06-17 PROCEDURE — 99395 PREV VISIT EST AGE 18-39: CPT

## 2019-06-17 RX ORDER — NYSTATIN 100000 [USP'U]/G
100000 CREAM TOPICAL 3 TIMES DAILY
Qty: 1 | Refills: 1 | Status: DISCONTINUED | COMMUNITY
Start: 2017-04-07 | End: 2019-06-17

## 2019-06-17 RX ORDER — DIAZEPAM 10 MG/1
10 TABLET ORAL
Qty: 14 | Refills: 0 | Status: ACTIVE | COMMUNITY
Start: 2019-05-09

## 2019-06-17 RX ORDER — BUPRENORPHINE HYDROCHLORIDE 8 MG/1
8 TABLET SUBLINGUAL
Qty: 28 | Refills: 0 | Status: ACTIVE | COMMUNITY
Start: 2019-05-23

## 2019-06-17 RX ORDER — NYSTATIN 100000 1/G
100000 POWDER TOPICAL 3 TIMES DAILY
Qty: 1 | Refills: 2 | Status: DISCONTINUED | COMMUNITY
Start: 2017-02-07 | End: 2019-06-17

## 2019-06-17 NOTE — PHYSICAL EXAM
[Awake] : awake [Alert] : alert [Acute Distress] : no acute distress [LAD] : no lymphadenopathy [Thyroid Nodule] : no thyroid nodule [Goiter] : no goiter [Mass] : no breast mass [Nipple Discharge] : no nipple discharge [Axillary LAD] : no axillary lymphadenopathy [Tender] : non tender [Distended] : not distended [H/Smegaly] : no hepatosplenomegaly [Oriented x3] : oriented to person, place, and time [Depressed Mood] : not depressed [Flat Affect] : affect not flat [No Lesions] : no genitalia lesions [Vulvar Atrophy] : no vulvar atrophy [Vulvitis] : no vulvitis [Labia Majora Erythema] : no erythema of the labia majora [Labia Minora Erythema] : no erythema of the labia minora [Labia Minora] : labia minora [Labia Majora] : labia major [Normal] : clitoris [Atrophy] : no atrophy [Erythema] : no erythema [Cystocele] : no cystocele [Rectocele] : no rectocele [Dry Mucosa] : moist mucosa [Uterine Prolapse] : no uterine prolapse [No Bleeding] : there was no active vaginal bleeding [Discharge] : had no discharge [Erosion] : had no erosions [Pap Obtained] : a Pap smear was performed [Motion Tenderness] : there was no cervical motion tenderness [Soft] :  the cervix was soft [Normal Position] : in a normal position [Tenderness] : nontender [Enlarged ___ wks] : not enlarged [Mass ___ cm] : no uterine mass was palpated [Uterine Adnexae] : were not tender and not enlarged [Ovarian Mass (___ Cm)] : there were no adnexal masses [Adnexa Tenderness] : were not tender

## 2019-06-17 NOTE — REVIEW OF SYSTEMS
[Feeling Tired] : feeling tired [Heart Rate Is Fast] : fast heart rate [SOB on Exertion] : shortness of breath during exertion [Abdominal Pain] : abdominal pain [Constipation] : constipation [Heartburn] : heartburn [Pelvic Pain] : pelvic pain [Incontinence] : incontinence [Urgency] : urgency [Joint Pain] : joint pain [Joint Stiffness] : joint stiffness [Dizziness] : dizziness [Sleep Disturbances] : sleep disturbances [Anxiety] : anxiety [Hot Flashes] : hot flashes [Easy Bruising] : easy bruising [Fever] : no fever [Chills] : no chills [Recent Wt Gain ___ Lbs] : no recent weight gain [Pain] : no pain [Sight Problems] : no sight problems [Redness] : no redness [Dec Hearing] : no hearing loss [Discharge] : no discharge [Nosebleeds] : no nosebleeds [Nasal Discharge] : no nasal discharge [Sore Throat] : no sore throat [Palpitations] : no palpitations [Chest Pain] : no chest pain [Lower Ext Edema] : no lower extremity edema [Wheezing] : no wheezing [Dyspnea] : no shortness of breath [Cough] : no cough [Diarrhea] : no diarrhea [Vomiting] : no vomiting [Dysuria] : no dysuria [Melena] : no melena [Abn Vag Bleeding] : no abnormal vaginal bleeding [Frequency] : no frequency [Urethral Discharge] : no abnormal urethral discharge [Arthralgias] : no arthralgias [Joint Swelling] : no joint swelling [Skin Lesions] : no skin lesions [Change In A Mole] : no change in a mole [Breast Pain] : no breast pain [Convulsions] : no convulsions [Breast Lump] : no breast lump [Depression] : no depression [Fainting] : no fainting [Headache] : no headache [Muscle Weakness] : no muscle weakness [Deepening Voice] : no deepening of the voice [Easy Bleeding] : does not bleed easily [Swollen Glands] : no swollen glands [Swollen Glands In The Neck] : no swollen glands in the neck [Feeling Weak] : no feelings of weakness

## 2019-06-17 NOTE — CHIEF COMPLAINT
[Annual Visit] : annual visit [FreeTextEntry1] : + HCG Concerns RE meds in pregnancy + autoimmune issues, HTN. BMI 37 LMP 5/13/19 + home UCG

## 2019-06-17 NOTE — COUNSELING
[Nutrition] : nutrition [Exercise] : exercise [Vitamins/Supplements] : vitamins/supplements [Pregnancy Options] : pregnancy options

## 2019-06-17 NOTE — HISTORY OF PRESENT ILLNESS
[Good] : being in good health [___ Year(s) Ago] : [unfilled] year(s) ago [Weight Concerns] : weight concens [Overweight] : overweight [Last Pap Smear ___] : last Papanicolaou cytology done [unfilled] [Serious Attempts To Lose] : serious weight loss attempts [No Previous Colonoscopy] : no previous colonoscopy [Performed Within 5 Years] : lipid profile performed within the past five years [Hypertension] : hypertension [Performed Last Year] : thyroid function test performed last year [Obesity] : obesity [Sedentary Lifestyle] : sedentary lifestyle [FH Cardiovascular Disease] : family history of cardiovascular disease [Definite:  ___ (Date)] : the last menstrual period was [unfilled] [Regular Cycle Intervals] : periods have been regular [Normal Amount/Duration] : was of a normal amount and duration [Menarche Age: ____] : age at menarche was [unfilled] [Menstrual Cramps] : menstrual cramps [Monogamous] : is monogamous [Sexually Active] : is sexually active [Male ___] : [unfilled] male [Healthy Diet] : not having a healthy diet [Regular Exercise] : not exercising regularly [Diabetes] : no diabetes [High LDL] : no high LDL cholesterol [Low HDL] : no low HDL cholesterol [Stress] : no stress [Tobacco Use] : no tobacco use [Illicit Drug Use] : no illicit drug use [Previous Breast Cancer] : no previous breast cancer [HPV Positive] : no positive screening for human papilloma virus [Abnormal Cervical Cytology] : no abnormal cervical cytology [Inflammatory Bowel Disease] : no inflammatory bowel disease [Radiation Exposure] : no radiation exposure [Asbestos Exposure] : no asbestos exposure [Occupational Exp. to ___] : no occupational exposure to [unfilled]

## 2019-06-19 LAB
ABO + RH PNL BLD: NORMAL
BASOPHILS # BLD AUTO: 0.04 K/UL
BASOPHILS NFR BLD AUTO: 0.5 %
C TRACH RRNA SPEC QL NAA+PROBE: NOT DETECTED
CANDIDA VAG CYTO: NOT DETECTED
EOSINOPHIL # BLD AUTO: 0.29 K/UL
EOSINOPHIL NFR BLD AUTO: 3.5 %
G VAGINALIS+PREV SP MTYP VAG QL MICRO: DETECTED
HBV SURFACE AG SER QL: NONREACTIVE
HCG SERPL-MCNC: 1167 MIU/ML
HCG UR QL: POSITIVE
HCT VFR BLD CALC: 41.4 %
HGB BLD-MCNC: 13.4 G/DL
HIV1+2 AB SPEC QL IA.RAPID: NONREACTIVE
HPV HIGH+LOW RISK DNA PNL CVX: NOT DETECTED
IMM GRANULOCYTES NFR BLD AUTO: 0.1 %
LYMPHOCYTES # BLD AUTO: 2.78 K/UL
LYMPHOCYTES NFR BLD AUTO: 33.1 %
MAN DIFF?: NORMAL
MCHC RBC-ENTMCNC: 27 PG
MCHC RBC-ENTMCNC: 32.4 GM/DL
MCV RBC AUTO: 83.5 FL
MEV IGG FLD QL IA: <5 AU/ML
MEV IGG+IGM SER-IMP: NEGATIVE
MONOCYTES # BLD AUTO: 0.48 K/UL
MONOCYTES NFR BLD AUTO: 5.7 %
N GONORRHOEA RRNA SPEC QL NAA+PROBE: NOT DETECTED
NEUTROPHILS # BLD AUTO: 4.79 K/UL
NEUTROPHILS NFR BLD AUTO: 57.1 %
PLATELET # BLD AUTO: 286 K/UL
QUALITY CONTROL: YES
RBC # BLD: 4.96 M/UL
RBC # FLD: 14.5 %
RUBV IGG FLD-ACNC: 1.9 INDEX
RUBV IGG SER-IMP: POSITIVE
SOURCE AMPLIFICATION: NORMAL
T GONDII AB SER-IMP: NEGATIVE
T GONDII IGG SER QL: <3 IU/ML
T PALLIDUM AB SER QL IA: NEGATIVE
T VAGINALIS VAG QL WET PREP: NOT DETECTED
WBC # FLD AUTO: 8.39 K/UL

## 2019-06-20 LAB
CYTOLOGY CVX/VAG DOC THIN PREP: NORMAL
HGB A MFR BLD: 97.7 %
HGB A2 MFR BLD: 2.3 %
HGB FRACT BLD-IMP: NORMAL

## 2019-06-21 RX ORDER — NIFEDIPINE 30 MG/1
30 TABLET, EXTENDED RELEASE ORAL DAILY
Qty: 30 | Refills: 4 | Status: DISCONTINUED | COMMUNITY
Start: 2018-04-17 | End: 2019-06-21

## 2019-06-26 ENCOUNTER — NON-APPOINTMENT (OUTPATIENT)
Age: 39
End: 2019-06-26

## 2019-06-26 ENCOUNTER — APPOINTMENT (OUTPATIENT)
Dept: CARDIOLOGY | Facility: CLINIC | Age: 39
End: 2019-06-26
Payer: MEDICAID

## 2019-06-26 ENCOUNTER — RECORD ABSTRACTING (OUTPATIENT)
Age: 39
End: 2019-06-26

## 2019-06-26 VITALS
HEIGHT: 61 IN | OXYGEN SATURATION: 99 % | BODY MASS INDEX: 37 KG/M2 | WEIGHT: 196 LBS | HEART RATE: 95 BPM | DIASTOLIC BLOOD PRESSURE: 89 MMHG | SYSTOLIC BLOOD PRESSURE: 131 MMHG

## 2019-06-26 DIAGNOSIS — F11.21 OPIOID DEPENDENCE, IN REMISSION: ICD-10-CM

## 2019-06-26 DIAGNOSIS — R00.0 TACHYCARDIA, UNSPECIFIED: ICD-10-CM

## 2019-06-26 DIAGNOSIS — F32.9 ANXIETY DISORDER, UNSPECIFIED: ICD-10-CM

## 2019-06-26 DIAGNOSIS — F41.9 ANXIETY DISORDER, UNSPECIFIED: ICD-10-CM

## 2019-06-26 PROCEDURE — 93000 ELECTROCARDIOGRAM COMPLETE: CPT

## 2019-06-26 PROCEDURE — 99215 OFFICE O/P EST HI 40 MIN: CPT

## 2019-06-26 RX ORDER — ZOLPIDEM TARTRATE 5 MG/1
5 TABLET ORAL
Qty: 30 | Refills: 0 | Status: DISCONTINUED | COMMUNITY
Start: 2019-01-08 | End: 2019-06-26

## 2019-06-27 ENCOUNTER — NON-APPOINTMENT (OUTPATIENT)
Age: 39
End: 2019-06-27

## 2019-06-27 PROBLEM — F41.9 ANXIETY AND DEPRESSION: Status: ACTIVE | Noted: 2019-06-27

## 2019-06-27 NOTE — ED ADULT TRIAGE NOTE - AS TEMP SITE
CHRISTINE attempted follow up with pt's mother.  Left voicemail explaining that Dr. Diggs is referring pt for a driving evaluation with an appropriately trained OT.  CHRISTINE has already shared with pt's mother the name of [facility] provider and the cost for the evaluation.  CHRISTINE also shared that [facility] can complete the same evaluation for cost of $550 over 2 appts.    
CHRISTINE received referral from Gladis Valdivia regarding pt's efforts to obtain a 's license.  Previously, pt's mother presented the office with the [facility] Medical Certification form, that would be required by the [facility] because pt has a seizure condition and other medical conditions that could interfere with his ability to safely operate a vehicle.  Dr. Diggs requested pt complete a driving evaluation with Fernando Lewis OT at [facility], first.      During recent visit with Gladis, pt's mother expressed confusion because pt is attending a 's education course at [employer] ($400) and was referred for the driving evaluation ($275); she believed these were duplicated services.  CHRISTINE explained the difference between 's education and a driving evaluation.  CHRISTINE also agreed to speak with Dr. Diggs about need for both services.    
oral

## 2019-07-01 ENCOUNTER — NON-APPOINTMENT (OUTPATIENT)
Age: 39
End: 2019-07-01

## 2019-07-01 ENCOUNTER — APPOINTMENT (OUTPATIENT)
Dept: OBGYN | Facility: CLINIC | Age: 39
End: 2019-07-01
Payer: MEDICAID

## 2019-07-01 VITALS
BODY MASS INDEX: 37.38 KG/M2 | HEART RATE: 81 BPM | WEIGHT: 198 LBS | SYSTOLIC BLOOD PRESSURE: 120 MMHG | DIASTOLIC BLOOD PRESSURE: 80 MMHG | HEIGHT: 61 IN | OXYGEN SATURATION: 97 %

## 2019-07-01 DIAGNOSIS — Z20.828 CONTACT WITH AND (SUSPECTED) EXPOSURE TO OTHER VIRAL COMMUNICABLE DISEASES: ICD-10-CM

## 2019-07-01 DIAGNOSIS — R10.9 UNSPECIFIED ABDOMINAL PAIN: ICD-10-CM

## 2019-07-01 DIAGNOSIS — B37.0 CANDIDAL STOMATITIS: ICD-10-CM

## 2019-07-01 DIAGNOSIS — Z86.79 PERSONAL HISTORY OF OTHER DISEASES OF THE CIRCULATORY SYSTEM: ICD-10-CM

## 2019-07-01 DIAGNOSIS — B37.3 CANDIDIASIS OF VULVA AND VAGINA: ICD-10-CM

## 2019-07-01 LAB
AR GENE MUT ANL BLD/T: NEGATIVE
BILIRUB UR QL STRIP: NEGATIVE
CFTR MUT TESTED BLD/T: NEGATIVE
CLARITY UR: CLEAR
COLLECTION METHOD: NORMAL
FMR1 GENE MUT ANL BLD/T: NORMAL
GLUCOSE UR-MCNC: NORMAL
HCG UR QL: 0.2 EU/DL
HGB UR QL STRIP.AUTO: NEGATIVE
KETONES UR-MCNC: NEGATIVE
LEUKOCYTE ESTERASE UR QL STRIP: NEGATIVE
NITRITE UR QL STRIP: NEGATIVE
PH UR STRIP: 6
PROT UR STRIP-MCNC: NEGATIVE
SP GR UR STRIP: 1

## 2019-07-01 PROCEDURE — 99214 OFFICE O/P EST MOD 30 MIN: CPT

## 2019-07-01 RX ORDER — METRONIDAZOLE 500 MG/1
500 TABLET ORAL TWICE DAILY
Qty: 14 | Refills: 0 | Status: DISCONTINUED | COMMUNITY
Start: 2019-06-20 | End: 2019-07-01

## 2019-07-01 RX ORDER — NEOMYCIN AND POLYMYXIN B SULFATES AND BACITRACIN ZINC 3.5; 10000; 4 MG/G; [USP'U]/G; [USP'U]/G
3.5-400-1 OINTMENT OPHTHALMIC
Qty: 35 | Refills: 0 | Status: DISCONTINUED | COMMUNITY
Start: 2019-02-25 | End: 2019-07-01

## 2019-07-01 RX ORDER — BUPRENORPHINE HYDROCHLORIDE, NALOXONE HYDROCHLORIDE 8; 2 MG/1; MG/1
8-2 FILM, SOLUBLE BUCCAL; SUBLINGUAL
Qty: 15 | Refills: 0 | Status: DISCONTINUED | COMMUNITY
Start: 2019-01-08 | End: 2019-07-01

## 2019-07-01 RX ORDER — UBIDECARENONE/VIT E ACET 100MG-5
CAPSULE ORAL
Refills: 0 | Status: DISCONTINUED | COMMUNITY
End: 2019-07-01

## 2019-07-01 RX ORDER — OSELTAMIVIR PHOSPHATE 75 MG/1
75 CAPSULE ORAL
Qty: 10 | Refills: 0 | Status: DISCONTINUED | COMMUNITY
Start: 2019-01-08 | End: 2019-07-01

## 2019-07-01 RX ORDER — CLONIDINE 0.2 MG/24H
0.2 PATCH, EXTENDED RELEASE TRANSDERMAL
Refills: 0 | Status: DISCONTINUED | COMMUNITY
Start: 2019-06-26 | End: 2019-07-01

## 2019-07-01 RX ORDER — PREDNISONE 10 MG/1
10 TABLET ORAL
Qty: 5 | Refills: 0 | Status: DISCONTINUED | COMMUNITY
Start: 2019-02-25 | End: 2019-07-01

## 2019-07-01 RX ORDER — NIFEDIPINE 30 MG/1
30 TABLET, FILM COATED, EXTENDED RELEASE ORAL DAILY
Qty: 90 | Refills: 1 | Status: DISCONTINUED | COMMUNITY
Start: 2019-02-11 | End: 2019-07-01

## 2019-07-01 RX ORDER — ROPINIROLE 0.25 MG/1
0.25 TABLET, FILM COATED ORAL
Qty: 15 | Refills: 0 | Status: DISCONTINUED | COMMUNITY
Start: 2018-07-16 | End: 2019-07-01

## 2019-07-01 RX ORDER — BENZONATATE 100 MG/1
100 CAPSULE ORAL
Qty: 10 | Refills: 0 | Status: DISCONTINUED | COMMUNITY
Start: 2019-03-27 | End: 2019-07-01

## 2019-07-01 RX ORDER — NYSTATIN 100000 [USP'U]/ML
100000 SUSPENSION ORAL
Qty: 105 | Refills: 0 | Status: DISCONTINUED | COMMUNITY
Start: 2018-12-28 | End: 2019-07-01

## 2019-07-01 RX ORDER — FLUCONAZOLE 150 MG/1
150 TABLET ORAL
Qty: 2 | Refills: 0 | Status: DISCONTINUED | COMMUNITY
Start: 2019-01-08 | End: 2019-07-01

## 2019-07-01 RX ORDER — SERTRALINE HYDROCHLORIDE 25 MG/1
25 TABLET, FILM COATED ORAL
Refills: 0 | Status: DISCONTINUED | COMMUNITY
Start: 2019-06-26 | End: 2019-07-01

## 2019-07-05 ENCOUNTER — EMERGENCY (EMERGENCY)
Facility: HOSPITAL | Age: 39
LOS: 1 days | Discharge: HOME | End: 2019-07-05
Attending: EMERGENCY MEDICINE | Admitting: EMERGENCY MEDICINE
Payer: MEDICAID

## 2019-07-05 VITALS
OXYGEN SATURATION: 98 % | HEART RATE: 76 BPM | SYSTOLIC BLOOD PRESSURE: 111 MMHG | RESPIRATION RATE: 16 BRPM | DIASTOLIC BLOOD PRESSURE: 71 MMHG

## 2019-07-05 VITALS
OXYGEN SATURATION: 97 % | SYSTOLIC BLOOD PRESSURE: 116 MMHG | DIASTOLIC BLOOD PRESSURE: 76 MMHG | RESPIRATION RATE: 17 BRPM | TEMPERATURE: 98 F | HEART RATE: 82 BPM | WEIGHT: 195.11 LBS

## 2019-07-05 LAB
ALBUMIN SERPL ELPH-MCNC: 4.3 G/DL — SIGNIFICANT CHANGE UP (ref 3.3–5)
ALP SERPL-CCNC: 46 U/L — SIGNIFICANT CHANGE UP (ref 40–120)
ALT FLD-CCNC: 24 U/L DA — SIGNIFICANT CHANGE UP (ref 10–45)
ANION GAP SERPL CALC-SCNC: 10 MMOL/L — SIGNIFICANT CHANGE UP (ref 5–17)
APPEARANCE UR: CLEAR — SIGNIFICANT CHANGE UP
AST SERPL-CCNC: 19 U/L — SIGNIFICANT CHANGE UP (ref 10–40)
BASOPHILS # BLD AUTO: 0.04 K/UL — SIGNIFICANT CHANGE UP (ref 0–0.2)
BASOPHILS NFR BLD AUTO: 0.5 % — SIGNIFICANT CHANGE UP (ref 0–2)
BILIRUB SERPL-MCNC: 0.3 MG/DL — SIGNIFICANT CHANGE UP (ref 0.2–1.2)
BILIRUB UR-MCNC: NEGATIVE — SIGNIFICANT CHANGE UP
BLD GP AB SCN SERPL QL: SIGNIFICANT CHANGE UP
BUN SERPL-MCNC: 9 MG/DL — SIGNIFICANT CHANGE UP (ref 7–23)
CALCIUM SERPL-MCNC: 9.7 MG/DL — SIGNIFICANT CHANGE UP (ref 8.4–10.5)
CHLORIDE SERPL-SCNC: 103 MMOL/L — SIGNIFICANT CHANGE UP (ref 96–108)
CO2 SERPL-SCNC: 24 MMOL/L — SIGNIFICANT CHANGE UP (ref 22–31)
COLOR SPEC: YELLOW — SIGNIFICANT CHANGE UP
CREAT SERPL-MCNC: 0.77 MG/DL — SIGNIFICANT CHANGE UP (ref 0.5–1.3)
DIFF PNL FLD: NEGATIVE — SIGNIFICANT CHANGE UP
EOSINOPHIL # BLD AUTO: 0.1 K/UL — SIGNIFICANT CHANGE UP (ref 0–0.5)
EOSINOPHIL NFR BLD AUTO: 1.4 % — SIGNIFICANT CHANGE UP (ref 0–6)
GLUCOSE SERPL-MCNC: 86 MG/DL — SIGNIFICANT CHANGE UP (ref 70–99)
GLUCOSE UR QL: NEGATIVE — SIGNIFICANT CHANGE UP
HCG SERPL-ACNC: HIGH MIU/ML
HCT VFR BLD CALC: 40.9 % — SIGNIFICANT CHANGE UP (ref 34.5–45)
HGB BLD-MCNC: 13.5 G/DL — SIGNIFICANT CHANGE UP (ref 11.5–15.5)
IMM GRANULOCYTES NFR BLD AUTO: 0.4 % — SIGNIFICANT CHANGE UP (ref 0–1.5)
KETONES UR-MCNC: NEGATIVE — SIGNIFICANT CHANGE UP
LEUKOCYTE ESTERASE UR-ACNC: NEGATIVE — SIGNIFICANT CHANGE UP
LYMPHOCYTES # BLD AUTO: 2.42 K/UL — SIGNIFICANT CHANGE UP (ref 1–3.3)
LYMPHOCYTES # BLD AUTO: 33 % — SIGNIFICANT CHANGE UP (ref 13–44)
MCHC RBC-ENTMCNC: 27.9 PG — SIGNIFICANT CHANGE UP (ref 27–34)
MCHC RBC-ENTMCNC: 33 GM/DL — SIGNIFICANT CHANGE UP (ref 32–36)
MCV RBC AUTO: 84.5 FL — SIGNIFICANT CHANGE UP (ref 80–100)
MONOCYTES # BLD AUTO: 0.37 K/UL — SIGNIFICANT CHANGE UP (ref 0–0.9)
MONOCYTES NFR BLD AUTO: 5 % — SIGNIFICANT CHANGE UP (ref 2–14)
NEUTROPHILS # BLD AUTO: 4.38 K/UL — SIGNIFICANT CHANGE UP (ref 1.8–7.4)
NEUTROPHILS NFR BLD AUTO: 59.7 % — SIGNIFICANT CHANGE UP (ref 43–77)
NITRITE UR-MCNC: NEGATIVE — SIGNIFICANT CHANGE UP
NRBC # BLD: 0 /100 WBCS — SIGNIFICANT CHANGE UP (ref 0–0)
PH UR: 7 — SIGNIFICANT CHANGE UP (ref 5–8)
PLATELET # BLD AUTO: 210 K/UL — SIGNIFICANT CHANGE UP (ref 150–400)
POTASSIUM SERPL-MCNC: 3.8 MMOL/L — SIGNIFICANT CHANGE UP (ref 3.5–5.3)
POTASSIUM SERPL-SCNC: 3.8 MMOL/L — SIGNIFICANT CHANGE UP (ref 3.5–5.3)
PROT SERPL-MCNC: 7.8 G/DL — SIGNIFICANT CHANGE UP (ref 6–8.3)
PROT UR-MCNC: NEGATIVE — SIGNIFICANT CHANGE UP
RBC # BLD: 4.84 M/UL — SIGNIFICANT CHANGE UP (ref 3.8–5.2)
RBC # FLD: 14.3 % — SIGNIFICANT CHANGE UP (ref 10.3–14.5)
SODIUM SERPL-SCNC: 137 MMOL/L — SIGNIFICANT CHANGE UP (ref 135–145)
SP GR SPEC: 1 — LOW (ref 1.01–1.02)
UROBILINOGEN FLD QL: NEGATIVE — SIGNIFICANT CHANGE UP
WBC # BLD: 7.34 K/UL — SIGNIFICANT CHANGE UP (ref 3.8–10.5)
WBC # FLD AUTO: 7.34 K/UL — SIGNIFICANT CHANGE UP (ref 3.8–10.5)

## 2019-07-05 PROCEDURE — 80053 COMPREHEN METABOLIC PANEL: CPT

## 2019-07-05 PROCEDURE — 76817 TRANSVAGINAL US OBSTETRIC: CPT

## 2019-07-05 PROCEDURE — 36415 COLL VENOUS BLD VENIPUNCTURE: CPT

## 2019-07-05 PROCEDURE — 84702 CHORIONIC GONADOTROPIN TEST: CPT

## 2019-07-05 PROCEDURE — 87086 URINE CULTURE/COLONY COUNT: CPT

## 2019-07-05 PROCEDURE — 85027 COMPLETE CBC AUTOMATED: CPT

## 2019-07-05 PROCEDURE — 86901 BLOOD TYPING SEROLOGIC RH(D): CPT

## 2019-07-05 PROCEDURE — 76817 TRANSVAGINAL US OBSTETRIC: CPT | Mod: 26

## 2019-07-05 PROCEDURE — 99284 EMERGENCY DEPT VISIT MOD MDM: CPT

## 2019-07-05 PROCEDURE — 86850 RBC ANTIBODY SCREEN: CPT

## 2019-07-05 PROCEDURE — 86900 BLOOD TYPING SEROLOGIC ABO: CPT

## 2019-07-05 RX ORDER — OMEGA-3 ACID ETHYL ESTERS 1 G
1 CAPSULE ORAL
Qty: 0 | Refills: 0 | DISCHARGE

## 2019-07-05 RX ORDER — FERROUS SULFATE 325(65) MG
1 TABLET ORAL
Qty: 0 | Refills: 0 | DISCHARGE

## 2019-07-05 RX ORDER — ASCORBIC ACID 60 MG
1 TABLET,CHEWABLE ORAL
Qty: 0 | Refills: 0 | DISCHARGE

## 2019-07-05 RX ORDER — SERTRALINE 25 MG/1
1 TABLET, FILM COATED ORAL
Qty: 0 | Refills: 0 | DISCHARGE

## 2019-07-05 RX ORDER — ZOLPIDEM TARTRATE 10 MG/1
1 TABLET ORAL
Qty: 0 | Refills: 0 | DISCHARGE

## 2019-07-05 RX ORDER — PREGABALIN 225 MG/1
1 CAPSULE ORAL
Qty: 0 | Refills: 0 | DISCHARGE

## 2019-07-05 NOTE — ED PROVIDER NOTE - NSFOLLOWUPINSTRUCTIONS_ED_ALL_ED_FT
Follow up your test results with your OBGYN appointment scheduled in 3 days     Stay hydrated    Return to the ER if your symptoms worsen, high fevers, severe pain or for any other medical emergencies    Vaginal Bleeding During Pregnancy, First Trimester    A small amount of bleeding (spotting) from the vagina is common during early pregnancy. Sometimes the bleeding is normal and does not cause problems. At other times, though, bleeding may be a sign of something serious. Tell your doctor about any bleeding from your vagina right away.    Follow these instructions at home:  Activity     Follow your doctor's instructions about how active you can be.  If needed, make plans for someone to help with your normal activities.  Do not have sex or orgasms until your doctor says that this is safe.  General instructions     Take over-the-counter and prescription medicines only as told by your doctor.  Watch your condition for any changes.  Write down:  The number of pads you use each day.  How often you change pads.  How soaked (saturated) your pads are.  Do not use tampons.  Do not douche.  If you pass any tissue from your vagina, save it to show to your doctor.  Keep all follow-up visits as told by your doctor. This is important.  Contact a doctor if:  You have vaginal bleeding at any time while you are pregnant.  You have cramps.  You have a fever.  Get help right away if:  You have very bad cramps in your back or belly (abdomen).  You pass large clots or a lot of tissue from your vagina.  Your bleeding gets worse.  You feel light-headed.  You feel weak.  You pass out (faint).  You have chills.  You are leaking fluid from your vagina.  You have a gush of fluid from your vagina.  Summary  Sometimes vaginal bleeding during pregnancy is normal and does not cause problems. At other times, bleeding may be a sign of something serious.  Tell your doctor about any bleeding from your vagina right away.  Follow your doctor's instructions about how active you can be. You may need someone to help you with your normal activities.

## 2019-07-05 NOTE — ED PROVIDER NOTE - PROGRESS NOTE DETAILS
CAROLIN Cunningham: Labs reviewed, pelvis US results pending.   patient s/o to CAROLIN Robertson and Dr. Vicente

## 2019-07-05 NOTE — ED PROVIDER NOTE - OBJECTIVE STATEMENT
39 y/o F, ~7weeks preg by LMP (19) with PMH of HTN presents to the ED with for intermittent episodes of right pelvic pain x few days and 1 episode of pink discharge when she urinated last night. Patient reports she saw her OBGYN Dr. Thurman 5 days ago, was started on Augmentin for UTI and treat for a yeast infection. She hasn't had an US for this pregnancy. She is scheduled to see a high risk OBGYN specialist in 3 days. Denies CP, SOB, n/v, urinary symptom, vaginal bleeding today or all other complaints

## 2019-07-05 NOTE — ED PROVIDER NOTE - CARE PROVIDER_API CALL
Andrea Thurman)  Obstetrics and Gynecology  [address] [place] [address], [address]  [place] [address]  Phone: [phone]  Fax: [phone]  Follow Up Time:

## 2019-07-05 NOTE — ED ADULT NURSE REASSESSMENT NOTE - NS ED NURSE REASSESS COMMENT FT1
Care of pt received from Lucy Peters RN. Pt currently having US conducted and is awaiting completion of test and results. Will continue to monitor.

## 2019-07-05 NOTE — ED PROVIDER NOTE - ATTENDING CONTRIBUTION TO CARE
Kassie with CAROLIN Murphy. 39 y/o F, ~7weeks preg by LMP (19) with PMH of HTN presents to the ED with for intermittent episodes of right pelvic pain x few days and 1 episode of pink discharge when she urinated last night. Patient reports she saw her OBGYN Dr. Thurman 5 days ago, was started on Augmentin for UTI and treat for a yeast infection. She hasn't had an US for this pregnancy. PE as noted above.  No bleeding into vaginal vault. Cervix normal. No CMT. Mimimal b/l adnexal discomfort with bimanual exam. Os closed. labs/UA pending, pelvic US pending, reassess.    I performed a face to face bedside interview with patient regarding history of present illness, review of symptoms and past medical history. I completed an independent physical exam.  I have discussed the patient's plan of care with Physician Assistant (PA). I agree with note as stated above, having amended the EMR as needed to reflect my findings.   This includes History of Present Illness, HIV, Past Medical/Surgical/Family/Social History, Allergies and Home Medications, Review of Systems, Physical Exam, and any Progress Notes during the time I functioned as the attending physician for this patient.

## 2019-07-05 NOTE — ED ADULT TRIAGE NOTE - CHIEF COMPLAINT QUOTE
I am 8 weeks pregnant. I am having right sided lower abdominal pain and I noticed some spotting last night.

## 2019-07-05 NOTE — ED ADULT NURSE NOTE - NSIMPLEMENTINTERV_GEN_ALL_ED
Implemented All Universal Safety Interventions:  [place] to call system. Call bell, personal items and telephone within reach. Instruct patient to call for assistance. Room bathroom lighting operational. Non-slip footwear when patient is off stretcher. Physically safe environment: no spills, clutter or unnecessary equipment. Stretcher in lowest position, wheels locked, appropriate side rails in place.

## 2019-07-05 NOTE — ED PROVIDER NOTE - CLINICAL SUMMARY MEDICAL DECISION MAKING FREE TEXT BOX
37 y/o F, ~7weeks preg by LMP (19) with PMH of HTN presents to the ED with for intermittent episodes of right pelvic pain x few days and 1 episode of pink discharge when she urinated last night. Patient reports she saw her OBGYN Dr. Thurman 5 days ago, was started on Augmentin for UTI and treat for a yeast infection. She hasn't had an US for this pregnancy. PE as noted above. labs/UA pending, pelvic US pending, reassess

## 2019-07-06 LAB
BACTERIA UR CULT: NORMAL
CANDIDA VAG CYTO: NOT DETECTED
CULTURE RESULTS: NO GROWTH — SIGNIFICANT CHANGE UP
G VAGINALIS+PREV SP MTYP VAG QL MICRO: NOT DETECTED
SPECIMEN SOURCE: SIGNIFICANT CHANGE UP
T VAGINALIS VAG QL WET PREP: NOT DETECTED

## 2019-07-08 ENCOUNTER — OUTPATIENT (OUTPATIENT)
Dept: OUTPATIENT SERVICES | Facility: HOSPITAL | Age: 39
LOS: 1 days | End: 2019-07-08
Payer: MEDICAID

## 2019-07-08 ENCOUNTER — ASOB RESULT (OUTPATIENT)
Age: 39
End: 2019-07-08

## 2019-07-08 ENCOUNTER — APPOINTMENT (OUTPATIENT)
Dept: MATERNAL FETAL MEDICINE | Facility: CLINIC | Age: 39
End: 2019-07-08

## 2019-07-08 ENCOUNTER — APPOINTMENT (OUTPATIENT)
Dept: ANTEPARTUM | Facility: CLINIC | Age: 39
End: 2019-07-08
Payer: MEDICAID

## 2019-07-08 VITALS
SYSTOLIC BLOOD PRESSURE: 132 MMHG | BODY MASS INDEX: 37.64 KG/M2 | WEIGHT: 199.38 LBS | HEIGHT: 61 IN | OXYGEN SATURATION: 98 % | DIASTOLIC BLOOD PRESSURE: 94 MMHG | HEART RATE: 84 BPM

## 2019-07-08 DIAGNOSIS — O09.899 SUPERVISION OF OTHER HIGH RISK PREGNANCIES, UNSPECIFIED TRIMESTER: ICD-10-CM

## 2019-07-08 DIAGNOSIS — O99.211 OBESITY COMPLICATING PREGNANCY, FIRST TRIMESTER: ICD-10-CM

## 2019-07-08 DIAGNOSIS — O10.011 PRE-EXISTING ESSENTIAL HYPERTENSION COMPLICATING PREGNANCY, FIRST TRIMESTER: ICD-10-CM

## 2019-07-08 LAB
BILIRUB UR QL STRIP: NORMAL
GLUCOSE UR-MCNC: NORMAL
HCG UR QL: 0.2 EU/DL
HGB UR QL STRIP.AUTO: NORMAL
KETONES UR-MCNC: NORMAL
LEUKOCYTE ESTERASE UR QL STRIP: NORMAL
NITRITE UR QL STRIP: NORMAL
PH UR STRIP: 6
PROT UR STRIP-MCNC: NORMAL
SP GR UR STRIP: 1

## 2019-07-08 PROCEDURE — 76801 OB US < 14 WKS SINGLE FETUS: CPT | Mod: 26

## 2019-07-08 PROCEDURE — 76801 OB US < 14 WKS SINGLE FETUS: CPT

## 2019-07-17 ENCOUNTER — ASOB RESULT (OUTPATIENT)
Age: 39
End: 2019-07-17

## 2019-07-17 ENCOUNTER — APPOINTMENT (OUTPATIENT)
Dept: ANTEPARTUM | Facility: CLINIC | Age: 39
End: 2019-07-17
Payer: MEDICAID

## 2019-07-17 PROCEDURE — 76801 OB US < 14 WKS SINGLE FETUS: CPT

## 2019-07-21 ENCOUNTER — LABORATORY RESULT (OUTPATIENT)
Age: 39
End: 2019-07-21

## 2019-07-22 ENCOUNTER — TRANSCRIPTION ENCOUNTER (OUTPATIENT)
Age: 39
End: 2019-07-22

## 2019-07-22 ENCOUNTER — OUTPATIENT (OUTPATIENT)
Dept: OUTPATIENT SERVICES | Facility: HOSPITAL | Age: 39
LOS: 1 days | End: 2019-07-22
Payer: MEDICAID

## 2019-07-22 ENCOUNTER — APPOINTMENT (OUTPATIENT)
Dept: MATERNAL FETAL MEDICINE | Facility: CLINIC | Age: 39
End: 2019-07-22

## 2019-07-22 ENCOUNTER — APPOINTMENT (OUTPATIENT)
Dept: OBGYN | Facility: CLINIC | Age: 39
End: 2019-07-22
Payer: MEDICAID

## 2019-07-22 VITALS
OXYGEN SATURATION: 98 % | HEART RATE: 80 BPM | SYSTOLIC BLOOD PRESSURE: 111 MMHG | WEIGHT: 201.94 LBS | TEMPERATURE: 99 F | RESPIRATION RATE: 17 BRPM | HEIGHT: 61 IN | DIASTOLIC BLOOD PRESSURE: 78 MMHG

## 2019-07-22 VITALS — BODY MASS INDEX: 38.36 KG/M2 | SYSTOLIC BLOOD PRESSURE: 120 MMHG | DIASTOLIC BLOOD PRESSURE: 84 MMHG | WEIGHT: 203 LBS

## 2019-07-22 DIAGNOSIS — G47.33 OBSTRUCTIVE SLEEP APNEA (ADULT) (PEDIATRIC): ICD-10-CM

## 2019-07-22 DIAGNOSIS — N76.0 ACUTE VAGINITIS: ICD-10-CM

## 2019-07-22 DIAGNOSIS — Z00.00 ENCOUNTER FOR GENERAL ADULT MEDICAL EXAMINATION W/OUT ABNORMAL FINDINGS: ICD-10-CM

## 2019-07-22 DIAGNOSIS — F11.10 OPIOID ABUSE, UNCOMPLICATED: ICD-10-CM

## 2019-07-22 DIAGNOSIS — Z01.818 ENCOUNTER FOR OTHER PREPROCEDURAL EXAMINATION: ICD-10-CM

## 2019-07-22 DIAGNOSIS — O02.1 MISSED ABORTION: ICD-10-CM

## 2019-07-22 DIAGNOSIS — B96.89 ACUTE VAGINITIS: ICD-10-CM

## 2019-07-22 DIAGNOSIS — Z92.89 PERSONAL HISTORY OF OTHER MEDICAL TREATMENT: Chronic | ICD-10-CM

## 2019-07-22 DIAGNOSIS — B37.3 CANDIDIASIS OF VULVA AND VAGINA: ICD-10-CM

## 2019-07-22 PROCEDURE — 86900 BLOOD TYPING SEROLOGIC ABO: CPT

## 2019-07-22 PROCEDURE — 87491 CHLMYD TRACH DNA AMP PROBE: CPT

## 2019-07-22 PROCEDURE — 99213 OFFICE O/P EST LOW 20 MIN: CPT

## 2019-07-22 PROCEDURE — G0463: CPT

## 2019-07-22 PROCEDURE — 86850 RBC ANTIBODY SCREEN: CPT

## 2019-07-22 PROCEDURE — 86901 BLOOD TYPING SEROLOGIC RH(D): CPT

## 2019-07-22 PROCEDURE — 85027 COMPLETE CBC AUTOMATED: CPT

## 2019-07-22 PROCEDURE — 87591 N.GONORRHOEAE DNA AMP PROB: CPT

## 2019-07-22 PROCEDURE — 81229 CYTOG ALYS CHRML ABNR SNPCGH: CPT

## 2019-07-22 RX ORDER — FLUCONAZOLE 200 MG/1
200 TABLET ORAL
Qty: 2 | Refills: 0 | Status: DISCONTINUED | COMMUNITY
Start: 2019-05-08 | End: 2019-07-22

## 2019-07-22 RX ORDER — LIDOCAINE HCL 20 MG/ML
0.2 VIAL (ML) INJECTION ONCE
Refills: 0 | Status: DISCONTINUED | OUTPATIENT
Start: 2019-07-23 | End: 2019-08-09

## 2019-07-22 RX ORDER — HYDROCORTISONE 1 %
1 OINTMENT (GRAM) TOPICAL
Qty: 0 | Refills: 0 | DISCHARGE

## 2019-07-22 RX ORDER — TERCONAZOLE 8 MG/G
0.8 CREAM VAGINAL
Qty: 1 | Refills: 3 | Status: DISCONTINUED | COMMUNITY
Start: 2019-07-01 | End: 2019-07-22

## 2019-07-22 RX ORDER — CLOTRIMAZOLE AND BETAMETHASONE DIPROPIONATE 10; .5 MG/G; MG/G
1-0.05 CREAM TOPICAL
Qty: 15 | Refills: 0 | Status: DISCONTINUED | COMMUNITY
Start: 2019-01-08 | End: 2019-07-22

## 2019-07-22 RX ORDER — AMOXICILLIN AND CLAVULANATE POTASSIUM 875; 125 MG/1; MG/1
875-125 TABLET, COATED ORAL
Qty: 14 | Refills: 0 | Status: DISCONTINUED | COMMUNITY
Start: 2019-07-01 | End: 2019-07-22

## 2019-07-22 RX ORDER — DIAZEPAM 5 MG
1 TABLET ORAL
Qty: 0 | Refills: 0 | DISCHARGE

## 2019-07-22 RX ORDER — SODIUM CHLORIDE 9 MG/ML
3 INJECTION INTRAMUSCULAR; INTRAVENOUS; SUBCUTANEOUS EVERY 8 HOURS
Refills: 0 | Status: DISCONTINUED | OUTPATIENT
Start: 2019-07-23 | End: 2019-08-09

## 2019-07-22 RX ORDER — NYSTATIN 100000 [USP'U]/ML
100000 SUSPENSION ORAL 4 TIMES DAILY
Qty: 1 | Refills: 1 | Status: DISCONTINUED | COMMUNITY
Start: 2019-07-01 | End: 2019-07-22

## 2019-07-22 RX ORDER — BUPRENORPHINE AND NALOXONE 2; .5 MG/1; MG/1
0.5 TABLET SUBLINGUAL
Qty: 0 | Refills: 0 | DISCHARGE

## 2019-07-22 RX ORDER — NYSTATIN 100000 [USP'U]/ML
100000 SUSPENSION ORAL 4 TIMES DAILY
Qty: 1 | Refills: 0 | Status: DISCONTINUED | COMMUNITY
Start: 2019-06-20 | End: 2019-07-22

## 2019-07-22 RX ORDER — DOXYCYCLINE 100 MG/1
100 CAPSULE ORAL
Qty: 5 | Refills: 0 | Status: DISCONTINUED | COMMUNITY
Start: 2019-03-29 | End: 2019-07-22

## 2019-07-22 RX ORDER — CHOLECALCIFEROL (VITAMIN D3) 125 MCG
1 CAPSULE ORAL
Qty: 0 | Refills: 0 | DISCHARGE

## 2019-07-22 NOTE — H&P PST ADULT - NSICDXPROBLEM_GEN_ALL_CORE_FT
PROBLEM DIAGNOSES  Problem: Opioid abuse  Assessment and Plan: has not used in 10 years is On Subutex. Discussed with Dr. Braswell. Pt instructed not to take any am of surgery.     Problem: CORNELIUS (obstructive sleep apnea)  Assessment and Plan: Sleep Apnea Protocol    Problem: Missed   Assessment and Plan: D and C for Missed

## 2019-07-22 NOTE — H&P PST ADULT - HISTORY OF PRESENT ILLNESS
38 yr old female pregnant has bleeding and spotting had sono  7/8 and 7/22 dx with missed ab for surgery 38 yr old female pregnant has bleeding and spotting had sono  7/8 and 7/22 dx with missed ab for surgery.    *** Spoke with Dr. Alfred regarding Subutex, tylenol and celebrex was d/c'd so patient may receive iv toradol.**

## 2019-07-22 NOTE — H&P PST ADULT - NSICDXPASTMEDICALHX_GEN_ALL_CORE_FT
PAST MEDICAL HISTORY:  Anxiety     Drug abuse, opioid type none in 10 years on a blocker    History of night terrors     Hypertension     Missed      Obese     CORNELIUS (obstructive sleep apnea) by criteria

## 2019-07-23 ENCOUNTER — RESULT REVIEW (OUTPATIENT)
Age: 39
End: 2019-07-23

## 2019-07-23 ENCOUNTER — APPOINTMENT (OUTPATIENT)
Dept: OBGYN | Facility: CLINIC | Age: 39
End: 2019-07-23

## 2019-07-23 ENCOUNTER — OUTPATIENT (OUTPATIENT)
Dept: OUTPATIENT SERVICES | Facility: HOSPITAL | Age: 39
LOS: 1 days | End: 2019-07-23
Payer: MEDICAID

## 2019-07-23 VITALS
SYSTOLIC BLOOD PRESSURE: 107 MMHG | TEMPERATURE: 98 F | HEIGHT: 61 IN | HEART RATE: 80 BPM | DIASTOLIC BLOOD PRESSURE: 73 MMHG | WEIGHT: 201.94 LBS | RESPIRATION RATE: 18 BRPM | OXYGEN SATURATION: 100 %

## 2019-07-23 VITALS
HEART RATE: 80 BPM | SYSTOLIC BLOOD PRESSURE: 122 MMHG | RESPIRATION RATE: 16 BRPM | OXYGEN SATURATION: 100 % | DIASTOLIC BLOOD PRESSURE: 66 MMHG

## 2019-07-23 DIAGNOSIS — Z01.818 ENCOUNTER FOR OTHER PREPROCEDURAL EXAMINATION: ICD-10-CM

## 2019-07-23 DIAGNOSIS — Z92.89 PERSONAL HISTORY OF OTHER MEDICAL TREATMENT: Chronic | ICD-10-CM

## 2019-07-23 DIAGNOSIS — O02.1 MISSED ABORTION: ICD-10-CM

## 2019-07-23 LAB
C TRACH RRNA SPEC QL NAA+PROBE: SIGNIFICANT CHANGE UP
N GONORRHOEA RRNA SPEC QL NAA+PROBE: SIGNIFICANT CHANGE UP
SPECIMEN SOURCE: SIGNIFICANT CHANGE UP

## 2019-07-23 PROCEDURE — 88280 CHROMOSOME KARYOTYPE STUDY: CPT

## 2019-07-23 PROCEDURE — 88305 TISSUE EXAM BY PATHOLOGIST: CPT | Mod: 26

## 2019-07-23 PROCEDURE — 76998 US GUIDE INTRAOP: CPT | Mod: 26

## 2019-07-23 PROCEDURE — 88233 TISSUE CULTURE SKIN/BIOPSY: CPT

## 2019-07-23 PROCEDURE — 88264 CHROMOSOME ANALYSIS 20-25: CPT

## 2019-07-23 PROCEDURE — 59840 INDUCED ABORTION D&C: CPT

## 2019-07-23 PROCEDURE — 88305 TISSUE EXAM BY PATHOLOGIST: CPT

## 2019-07-23 PROCEDURE — 59820 CARE OF MISCARRIAGE: CPT

## 2019-07-23 RX ORDER — LANOLIN ALCOHOL/MO/W.PET/CERES
1 CREAM (GRAM) TOPICAL
Qty: 0 | Refills: 0 | DISCHARGE

## 2019-07-23 RX ORDER — ACETAMINOPHEN 500 MG
1000 TABLET ORAL ONCE
Refills: 0 | Status: DISCONTINUED | OUTPATIENT
Start: 2019-07-23 | End: 2019-08-09

## 2019-07-23 RX ORDER — ONDANSETRON 8 MG/1
4 TABLET, FILM COATED ORAL ONCE
Refills: 0 | Status: DISCONTINUED | OUTPATIENT
Start: 2019-07-23 | End: 2019-08-09

## 2019-07-23 RX ORDER — DIAZEPAM 5 MG
7.5 TABLET ORAL
Qty: 0 | Refills: 0 | DISCHARGE

## 2019-07-23 RX ORDER — SODIUM CHLORIDE 9 MG/ML
1000 INJECTION, SOLUTION INTRAVENOUS
Refills: 0 | Status: DISCONTINUED | OUTPATIENT
Start: 2019-07-23 | End: 2019-08-09

## 2019-07-23 RX ORDER — ZOLPIDEM TARTRATE 10 MG/1
1 TABLET ORAL
Qty: 0 | Refills: 0 | DISCHARGE

## 2019-07-23 RX ORDER — BUPRENORPHINE 10 UG/H
4 PATCH, EXTENDED RELEASE TRANSDERMAL
Qty: 0 | Refills: 0 | DISCHARGE

## 2019-07-23 RX ORDER — SERTRALINE 25 MG/1
1 TABLET, FILM COATED ORAL
Qty: 0 | Refills: 0 | DISCHARGE

## 2019-07-23 RX ORDER — LABETALOL HCL 100 MG
1 TABLET ORAL
Qty: 0 | Refills: 0 | DISCHARGE

## 2019-07-23 NOTE — PHYSICAL EXAM
[Awake] : awake [Alert] : alert [Soft] : soft [Oriented x3] : oriented to person, place, and time [No Lesions] : no genitalia lesions [Labia Majora] : labia major [Labia Minora] : labia minora [Normal] : clitoris [Pink Rugae] : pink rugae [No Bleeding] : there was no active vaginal bleeding [Uterine Adnexae] : were not tender and not enlarged [Acute Distress] : no acute distress [Tender] : non tender [Distended] : not distended [Dilated] : the cervix was not dilated [Discharge] : had no discharge [Tenderness] : nontender [Motion Tenderness] : there was no cervical motion tenderness [Adnexa Tenderness] : were not tender

## 2019-07-23 NOTE — REVIEW OF SYSTEMS
[Fever] : no fever [Chills] : no chills [Chest Pain] : no chest pain [Palpitations] : no palpitations [Dyspnea] : no shortness of breath [Cough] : no cough [Vomiting] : no vomiting [Abdominal Pain] : no abdominal pain [Dysuria] : no dysuria [Pelvic Pain] : no pelvic pain [Constipation] : no constipation [Dizziness] : no dizziness [Frequency] : no frequency [Urgency] : no urgency [Headache] : no headache

## 2019-07-23 NOTE — ASU PATIENT PROFILE, ADULT - PMH
Anxiety    Drug abuse, opioid type  none in 10 years on a blocker  History of night terrors    Hypertension    Missed     Obese    CORNELIUS (obstructive sleep apnea)  by criteria

## 2019-07-23 NOTE — BRIEF OPERATIVE NOTE - OPERATION/FINDINGS
Paracervical block given with 20cc of 1% lidocaine. D&C performed under ultrasound guidance. 7mm curved suction curetted used to clear uterus of POC. Specimen examined, all POC accounted for. Bedside sono at the end of the procedure revealed thin endometrial stripe, although difficult to visualize secondary to body habitus. anteverted uterus approximately 6 weeks size.  Paracervical block given with 20cc of 1% lidocaine. D&C performed under ultrasound guidance. 7mm curved suction curetted used to clear uterus of POC. gestational sac and villi noted.  BT: O+

## 2019-07-23 NOTE — PROCEDURE
[Intrauterine Pregnancy] : intrauterine pregnancy [Fetal Heart] : no fetal heart [Yolk Sac] : no yolk sac [CRL: ___ (mm)] : CRL - [unfilled]Umm [FreeTextEntry1] : possible CRL

## 2019-07-23 NOTE — ASU DISCHARGE PLAN (ADULT/PEDIATRIC) - CARE PROVIDER_API CALL
Evelia Jacobson)  OBSN  General  [address] [place] [id number]  Phone: [phone]  Fax: [phone]  Follow Up Time:

## 2019-07-26 LAB — SURGICAL PATHOLOGY STUDY: SIGNIFICANT CHANGE UP

## 2019-07-28 ENCOUNTER — RESULT REVIEW (OUTPATIENT)
Age: 39
End: 2019-07-28

## 2019-08-05 PROBLEM — F11.10 OPIOID ABUSE, UNCOMPLICATED: Chronic | Status: ACTIVE | Noted: 2019-07-22

## 2019-08-05 PROBLEM — G47.33 OBSTRUCTIVE SLEEP APNEA (ADULT) (PEDIATRIC): Chronic | Status: ACTIVE | Noted: 2019-07-22

## 2019-08-05 PROBLEM — E66.9 OBESITY, UNSPECIFIED: Chronic | Status: ACTIVE | Noted: 2019-07-22

## 2019-08-05 PROBLEM — O02.1 MISSED ABORTION: Chronic | Status: ACTIVE | Noted: 2019-07-22

## 2019-08-05 PROBLEM — F41.9 ANXIETY DISORDER, UNSPECIFIED: Chronic | Status: ACTIVE | Noted: 2019-07-22

## 2019-08-05 PROBLEM — Z87.898 PERSONAL HISTORY OF OTHER SPECIFIED CONDITIONS: Chronic | Status: ACTIVE | Noted: 2019-07-22

## 2019-08-05 LAB — CHROM ANALY OVERALL INTERP SPEC-IMP: SIGNIFICANT CHANGE UP

## 2019-08-07 DIAGNOSIS — O02.1 MISSED ABORTION: ICD-10-CM

## 2019-08-13 ENCOUNTER — APPOINTMENT (OUTPATIENT)
Dept: CARDIOLOGY | Facility: CLINIC | Age: 39
End: 2019-08-13

## 2019-08-14 ENCOUNTER — APPOINTMENT (OUTPATIENT)
Dept: OBGYN | Facility: CLINIC | Age: 39
End: 2019-08-14
Payer: MEDICAID

## 2019-08-14 ENCOUNTER — OUTPATIENT (OUTPATIENT)
Dept: OUTPATIENT SERVICES | Facility: HOSPITAL | Age: 39
LOS: 1 days | End: 2019-08-14
Payer: MEDICAID

## 2019-08-14 VITALS — SYSTOLIC BLOOD PRESSURE: 118 MMHG | WEIGHT: 209 LBS | BODY MASS INDEX: 39.49 KG/M2 | DIASTOLIC BLOOD PRESSURE: 80 MMHG

## 2019-08-14 DIAGNOSIS — Z92.89 PERSONAL HISTORY OF OTHER MEDICAL TREATMENT: Chronic | ICD-10-CM

## 2019-08-14 DIAGNOSIS — O02.1 MISSED ABORTION: ICD-10-CM

## 2019-08-14 DIAGNOSIS — Z3A.01 LESS THAN 8 WEEKS GESTATION OF PREGNANCY: ICD-10-CM

## 2019-08-14 DIAGNOSIS — N76.0 ACUTE VAGINITIS: ICD-10-CM

## 2019-08-14 DIAGNOSIS — O09.891 SUPERVISION OF OTHER HIGH RISK PREGNANCIES, FIRST TRIMESTER: ICD-10-CM

## 2019-08-14 DIAGNOSIS — O09.90 SUPERVISION OF HIGH RISK PREGNANCY, UNSPECIFIED, UNSPECIFIED TRIMESTER: ICD-10-CM

## 2019-08-14 DIAGNOSIS — Z32.01 ENCOUNTER FOR PREGNANCY TEST, RESULT POSITIVE: ICD-10-CM

## 2019-08-14 PROCEDURE — G0463: CPT

## 2019-08-14 PROCEDURE — 99024 POSTOP FOLLOW-UP VISIT: CPT

## 2019-08-20 DIAGNOSIS — Z98.890 OTHER SPECIFIED POSTPROCEDURAL STATES: ICD-10-CM

## 2019-08-20 DIAGNOSIS — Z09 ENCOUNTER FOR FOLLOW-UP EXAMINATION AFTER COMPLETED TREATMENT FOR CONDITIONS OTHER THAN MALIGNANT NEOPLASM: ICD-10-CM

## 2019-08-20 NOTE — PHYSICAL EXAM
[Moderate] : moderate [No Bleeding] : there was no active vaginal bleeding [Awake] : awake [Alert] : alert [Soft] : soft [Obese] : obese [Oriented x3] : oriented to person, place, and time [No Lesions] : no genitalia lesions [Labia Majora] : labia major [Discharge] : had a ~M discharge [Labia Minora] : labia minora [Normal] : clitoris stated [Scant] : scant [White] : white [Thin] : thin [Anteversion] : anteverted [Uterine Adnexae] : were not tender and not enlarged [Acute Distress] : no acute distress [Tender] : non tender [Distended] : not distended [H/Smegaly] : no hepatosplenomegaly [Depressed Mood] : not depressed [Flat Affect] : affect not flat [Dilated] : the cervix was not dilated [Foul Smelling] : not foul smelling [Tenderness] : nontender [Enlarged ___ wks] : not enlarged

## 2019-08-25 NOTE — DISCHARGE NOTE ADULT - VISION (WITH CORRECTIVE LENSES IF THE PATIENT USUALLY WEARS THEM):
IOL for polyhydramnios and macrosomia
Normal vision: sees adequately in most situations; can see medication labels, newsprint

## 2019-09-16 LAB — MICROARRAY DELETION: SIGNIFICANT CHANGE UP

## 2019-09-20 ENCOUNTER — RESULT REVIEW (OUTPATIENT)
Age: 39
End: 2019-09-20

## 2020-02-18 ENCOUNTER — APPOINTMENT (OUTPATIENT)
Dept: OBGYN | Facility: CLINIC | Age: 40
End: 2020-02-18

## 2020-05-26 ENCOUNTER — RX RENEWAL (OUTPATIENT)
Age: 40
End: 2020-05-26

## 2020-05-27 ENCOUNTER — LABORATORY RESULT (OUTPATIENT)
Age: 40
End: 2020-05-27

## 2020-05-27 ENCOUNTER — OUTPATIENT (OUTPATIENT)
Dept: OUTPATIENT SERVICES | Facility: HOSPITAL | Age: 40
LOS: 1 days | End: 2020-05-27
Payer: MEDICAID

## 2020-05-27 ENCOUNTER — APPOINTMENT (OUTPATIENT)
Dept: OBGYN | Facility: CLINIC | Age: 40
End: 2020-05-27
Payer: MEDICAID

## 2020-05-27 VITALS — SYSTOLIC BLOOD PRESSURE: 150 MMHG | DIASTOLIC BLOOD PRESSURE: 88 MMHG | WEIGHT: 218 LBS | BODY MASS INDEX: 41.19 KG/M2

## 2020-05-27 DIAGNOSIS — Z92.89 PERSONAL HISTORY OF OTHER MEDICAL TREATMENT: Chronic | ICD-10-CM

## 2020-05-27 DIAGNOSIS — N76.0 ACUTE VAGINITIS: ICD-10-CM

## 2020-05-27 DIAGNOSIS — O02.1 MISSED ABORTION: ICD-10-CM

## 2020-05-27 PROCEDURE — 99214 OFFICE O/P EST MOD 30 MIN: CPT | Mod: NC

## 2020-05-27 PROCEDURE — 87086 URINE CULTURE/COLONY COUNT: CPT

## 2020-05-27 PROCEDURE — G0463: CPT

## 2020-05-27 PROCEDURE — 87800 DETECT AGNT MULT DNA DIREC: CPT

## 2020-05-27 RX ORDER — ULIPRISTAL ACETATE 30 MG/1
30 TABLET ORAL ONCE
Qty: 1 | Refills: 11 | Status: DISCONTINUED | COMMUNITY
Start: 2019-08-14 | End: 2020-05-27

## 2020-05-27 RX ORDER — PRENATAL VIT NO.130/IRON/FOLIC 27MG-0.8MG
27-0.8 TABLET ORAL
Qty: 90 | Refills: 2 | Status: DISCONTINUED | COMMUNITY
Start: 2019-06-17 | End: 2020-05-27

## 2020-05-28 LAB
CANDIDA AB TITR SER: SIGNIFICANT CHANGE UP
G VAGINALIS DNA SPEC QL NAA+PROBE: SIGNIFICANT CHANGE UP
T VAGINALIS SPEC QL WET PREP: SIGNIFICANT CHANGE UP

## 2020-05-28 NOTE — HISTORY OF PRESENT ILLNESS
[Suprapubic] : suprapubic area [Lower-Rt-Q] : lower right quadrant [Lower-Lt-Q] : left lower quadrant [Dull] : dull [Vaginal Discharge] : vaginal discharge [Vaginal Bleeding] : vaginal bleeding [Localized] : a localized [Discharge] : discharge [Vagina] : vagina [Approximately ___ (Month)] : the LMP was approximately [unfilled] month(s) ago [Normal Amount/Duration] : was of a normal amount and duration [Spotting Between  Menses] : spotting reported between menses [FreeTextEntry6] : h/o TOP in July 2019 [Continuous] : no continuous [FreeTextEntry9] : 3 episodes of bacterial vaginosis over the past year, no current complaint of discharge [Menstrual Cramps] : no menstrual cramps [Regular Cycle Intervals] : periods have been irregular [Sexually Active] : is not sexually active [Contraception] : does not use contraception

## 2020-05-28 NOTE — PHYSICAL EXAM
[Normal] : cervix [Scant] : scant [Discharge] : a  ~M vaginal discharge was present [White] : white [No Bleeding] : there was no active vaginal bleeding [Uterine Adnexae] : were not tender and not enlarged [Foul Smelling] : not foul smelling [Pap Obtained] : a Pap smear was not performed [Tenderness] : nontender

## 2020-05-29 DIAGNOSIS — R10.2 PELVIC AND PERINEAL PAIN: ICD-10-CM

## 2020-05-29 DIAGNOSIS — N92.3 OVULATION BLEEDING: ICD-10-CM

## 2020-05-29 LAB
CULTURE RESULTS: NO GROWTH — SIGNIFICANT CHANGE UP
SPECIMEN SOURCE: SIGNIFICANT CHANGE UP

## 2020-06-02 ENCOUNTER — APPOINTMENT (OUTPATIENT)
Dept: ULTRASOUND IMAGING | Facility: HOSPITAL | Age: 40
End: 2020-06-02
Payer: MEDICAID

## 2020-06-02 ENCOUNTER — OUTPATIENT (OUTPATIENT)
Dept: OUTPATIENT SERVICES | Facility: HOSPITAL | Age: 40
LOS: 1 days | End: 2020-06-02
Payer: MEDICAID

## 2020-06-02 DIAGNOSIS — R10.2 PELVIC AND PERINEAL PAIN: ICD-10-CM

## 2020-06-02 DIAGNOSIS — Z92.89 PERSONAL HISTORY OF OTHER MEDICAL TREATMENT: Chronic | ICD-10-CM

## 2020-06-02 PROCEDURE — 76830 TRANSVAGINAL US NON-OB: CPT

## 2020-06-02 PROCEDURE — 76856 US EXAM PELVIC COMPLETE: CPT | Mod: 26

## 2020-06-02 PROCEDURE — 76856 US EXAM PELVIC COMPLETE: CPT

## 2020-06-02 PROCEDURE — 76830 TRANSVAGINAL US NON-OB: CPT | Mod: 26

## 2020-06-08 ENCOUNTER — APPOINTMENT (OUTPATIENT)
Dept: OBGYN | Facility: CLINIC | Age: 40
End: 2020-06-08
Payer: MEDICAID

## 2020-06-08 ENCOUNTER — OUTPATIENT (OUTPATIENT)
Dept: OUTPATIENT SERVICES | Facility: HOSPITAL | Age: 40
LOS: 1 days | End: 2020-06-08
Payer: MEDICAID

## 2020-06-08 VITALS — WEIGHT: 217 LBS | DIASTOLIC BLOOD PRESSURE: 80 MMHG | BODY MASS INDEX: 41 KG/M2 | SYSTOLIC BLOOD PRESSURE: 128 MMHG

## 2020-06-08 DIAGNOSIS — N76.0 ACUTE VAGINITIS: ICD-10-CM

## 2020-06-08 DIAGNOSIS — Z01.419 ENCOUNTER FOR GYNECOLOGICAL EXAMINATION (GENERAL) (ROUTINE) W/OUT ABNORMAL FINDINGS: ICD-10-CM

## 2020-06-08 DIAGNOSIS — Z92.89 PERSONAL HISTORY OF OTHER MEDICAL TREATMENT: Chronic | ICD-10-CM

## 2020-06-08 DIAGNOSIS — Z09 ENCOUNTER FOR FOLLOW-UP EXAMINATION AFTER COMPLETED TREATMENT FOR CONDITIONS OTHER THAN MALIGNANT NEOPLASM: ICD-10-CM

## 2020-06-08 DIAGNOSIS — N92.3 OVULATION BLEEDING: ICD-10-CM

## 2020-06-08 DIAGNOSIS — R10.2 PELVIC AND PERINEAL PAIN: ICD-10-CM

## 2020-06-08 DIAGNOSIS — D25.1 INTRAMURAL LEIOMYOMA OF UTERUS: ICD-10-CM

## 2020-06-08 DIAGNOSIS — Z87.42 PERSONAL HISTORY OF OTHER DISEASES OF THE FEMALE GENITAL TRACT: ICD-10-CM

## 2020-06-08 PROCEDURE — 99213 OFFICE O/P EST LOW 20 MIN: CPT | Mod: NC

## 2020-06-08 PROCEDURE — G0463: CPT

## 2020-06-08 NOTE — HISTORY OF PRESENT ILLNESS
[Last Pap ___] : Last cervical pap smear was [unfilled] [Menstrual Problems] : reports abnormal menses [Reproductive Age] : is of reproductive age [Definite ___ (Date)] : the last menstrual period was [unfilled] [Contraception] : does not use contraception [Pregnancy History] : pregnancy history: [Staining] : described as blood tinged staining of undergarments [Bleeding] : bleeding [FreeTextEntry5] : newly found fibroid on ultrasound 1.1cm intramural [Fibroids] : fibroids [Approximately ___ (Month)] : the LMP was approximately [unfilled] month(s) ago [Spotting Between  Menses] : spotting reported between menses [Normal Amount/Duration] : was of a normal amount and duration [Sexually Active] : is not sexually active

## 2020-06-10 DIAGNOSIS — D25.1 INTRAMURAL LEIOMYOMA OF UTERUS: ICD-10-CM

## 2020-06-10 DIAGNOSIS — N92.3 OVULATION BLEEDING: ICD-10-CM

## 2020-08-25 RX ORDER — LABETALOL HYDROCHLORIDE 100 MG/1
100 TABLET, FILM COATED ORAL
Qty: 30 | Refills: 0 | Status: ACTIVE | COMMUNITY
Start: 2018-01-29 | End: 1900-01-01

## 2021-01-27 NOTE — PROGRESS NOTE BEHAVIORAL HEALTH - NSBHCHARTREVIEWVS_PSY_A_CORE FT
Referred by: Sandra Moreland MD; Medical Diagnosis (from order):    Diagnosis Information      Diagnosis    719.46 (ICD-9-CM) - M25.561 (ICD-10-CM) - Acute pain of right knee                Physical Therapy -  Initial Evaluation -  Daily Treatment Note    Visit:  1   Treatment Diagnosis: right: knee symptoms with impaired activity tolerance, increased pain/symptoms, impaired motor function/performance/coordination, impaired tissue mobility  Date of onset: July 2020  Chart reviewed at time of initial evaluation (relevant co-morbidities, allergies, tests and medications listed): There is no previous medical history on file.  There is no previous surgical history on file.  Current Outpatient Medications:  meloxicam (MOBIC) 15 MG tablet, Take 1 tablet by mouth daily.    No current facility-administered medications for this encounter.     ALLERGIES:  No Known Allergies    SUBJECTIVE                                                                                                             6 months ago feels like he tore something in the back of the RIGHT knee playing tennis. He thinks he over extended his knee. Now the back of his knee hurts with movement and there is a click in the back of the knee with movement. Sometimes click is painful.   Gave it some rest over the holidays. Feels better with rest but starts to hurt whenever he moves it.     Dr. Douglas setup order for MRI for the RIGHT knee.    Playing a little bit of indoor tennis currently. 1-2x/wk. RIGHT knee hurts. Doesn't notice a lot of swelling after tennis.   Functional Change: Pt's RIGHT knee pain effects his ability to fully participate in age appropriate recreational activity of tennis, and with negotiating stairs, walking, and squatting.    Pain / Symptoms:  Pain rating (out of 10): Current: 5 ; Best: 0 (with rest and laying down); Worst: 7  Location: RIGHT knee   Quality / Description: stiff, sharp.  Alleviating Factors: rest, heat.   Function: 
T(C): 37.1 (01-24-18 @ 06:26), Max: 37.2 (01-23-18 @ 16:17)  HR: 97 (01-24-18 @ 06:26) (91 - 110)  BP: 137/89 (01-24-18 @ 06:26) (137/89 - 189/148)  RR: 18 (01-24-18 @ 06:26) (18 - 18)  SpO2: 95% (01-24-18 @ 06:26) (95% - 99%)  Wt(kg): --
  Limitations / Exacerbation Factors: pain, difficulty, increased time, bending/squatting/lifting and walking, stairs, Tennis  Prior Level of Function: declining function, therefore referred to therapy,  Patient Goals: increased strength, decreased pain, increased motion and independence with ADLs/IADLs    Prior treatment: no therapies  Discharged from hospital, home health, or skilled nursing facility in last 30 days: no    Home Environment:   Patient lives with significant other. children  Type of home: multiple level home (with stairs)  Assistance available: consistent  Feels safe at home/work/school.  2 or more falls or an unexplained fall with injury in the last year:  No    OBJECTIVE                                                                                                                     Range of Motion (ROM)   (degrees unless noted; active unless noted; norms in ( ); negative=lacking to 0, positive=beyond 0)   WNL: LLE, RLE     Palpation:   Right Lower Extremity: Semimembranosus: tender; Semitendinosus: tender;   Comments / Details: Distal insertion of semitendinosis/semimembrinosis tender on the RIGHT    Special Tests:  Posterior Drawer: Right: negative  Anterior Drawer: Right: negative  Varus Stress Test: at 0°: Right: negative at 30°: Right: negative  Valgus Stress Test:  at 0°: Right: negative at 30°: Right: negative  Apley's Compression Test: Right: negative  Apley's Distraction Test: Right: negative    Comments / Details: Outcome Measures:   Lower Extremity Functional Scale: LEFS Calculated Total: 42 (0=extreme difficulty; 80=no difficulty) see flowsheet for additional documentation    TREATMENT                                                                                                                initial evaluation completed    Therapeutic Exercise:  Pt educated on PT diagnosis, prognosis, eval findings, concentric vs. eccentric contractions, and hamstring muscle group anatomy.  See 
HEP section, pt performed one set of each exercise    Skilled input: verbal instruction/cues and tactile instruction/cues    Writer verbally educated and received verbal consent for hand placement, positioning of patient, and techniques to be performed today from patient for hand placement and palpation for techniques, clothing adjustments for techniques and therapist position for techniques as described above and how they are pertinent to the patient's plan of care.    Home Exercise Program: Access Code: [email or web address]   URL: [email or web address]   Date: 01/27/2021   Prepared by: Nabil Kam      Exercises  · The Extender - 12 reps - 3 sets - 1x daily - 7x weekly  · The Diver - 6 reps - 3 sets - 1x daily - 4x weekly  · The Glider - 4 reps - 3 sets - 1x daily - 2x weekly         ASSESSMENT                                                                                                           38 year old male patient has reported functional limitations listed above impacted by signs and symptoms consistent with below   • Involved:       - right knee   • Symptoms/impairments: impaired activity tolerance, increased pain/symptoms, impaired motor function/performance/coordination and impaired tissue mobility  01/27/21  Pt is a 38 year old yo male who presents to PT today with c/o RIGHT posterior knee pain. Pt's symptoms are consistent with referring diagnosis. Pt demonstrates impairments in: pain, tenderness, and patient's main impairment of functional activity limitations due to knee pain. Pt's RIGHT knee pain effects his ability to fully participate in age appropriate recreational activity of tennis, and with negotiating stairs, walking, and squatting. Anticipate pt would benefit from skilled physical therapy to improve upon impairments listed above.      Prognosis: patient will benefit from skilled therapy  Rehabilitative: good  Predicted patient presentation: Low (stable) - Patient 
comorbidities and complexities, as defined above, will have little effect on progress for prescribed plan of care.  Patient Education:   Who will be receiving education: patient  Are they ready to learn: yes  Preferred learning style: written, verbal and demonstration  Barriers to learning: no barriers apparent at this time  Results of above outlined education: Verbalizes understanding and Demonstrates understanding    Assistant to modify based on patient progress and response to treatment. and Therapist performed and billed unit(s) of today's treatment.        PLAN                                                                                                                         The following skilled interventions to be implemented to achieve goals listed below:  Activities of Daily Living/Self Care (44109)  Gait Training (35392)  Manual Therapy (11713)  Neuromuscular Re-Education (22600)  Therapeutic Activity (45483)  Therapeutic Exercise (97214)  Electrical Stimulation (48831//85768)  Heat/Cold (56300)  Aquatic Therapy (50987)  Performance Test or Measure (25953)    Frequency / Duration: 2 times per month tapering as patient progresses for an estimated total of 6 visits for 12 weeks    Patient involved in and agreed to plan of care and goals.  Attendance policy reviewed with patient.  Suggestions for next session as indicated: Progress per plan of care, plan to progress eccentric strength of the RIGHT hamstring in terminal knee extension      GOALS                                                                                                                           Long Term Goals: to be met be end of plan of care  1. Patient will be independent with progressed and modified home exercise program.  2. Patient will be able to play tennis at prior level of function without reported difficulty or <2/10 pain in the RIGHT knee to improve ability to return to age appropriate recreational activity by end of 
plan of care.  3. Patient will be able to ambulate >1 hour and squat to pickup object from floor without pain in the RIGHT knee to indicate improvement in functional mobility and activity tolerance by end of plan of care.  4. Lower Extremity Functional Scale: Patient will complete form to reflect an improved raw score to greater than or equal to 65/80 to indicate patient reported improvement in function/disability/impairment (minimal detectable change: 9 points).       Procedures and total treatment time documented Time Entry flowsheet.  
Vital Signs Last 24 Hrs  T(C): 36.9 (25 Jan 2018 10:28), Max: 37.1 (24 Jan 2018 20:27)  T(F): 98.4 (25 Jan 2018 10:28), Max: 98.7 (24 Jan 2018 20:27)  HR: 85 (25 Jan 2018 10:28) (76 - 102)  BP: 168/118 (25 Jan 2018 10:28) (123/86 - 168/118)  BP(mean): --  RR: 18 (25 Jan 2018 10:28) (18 - 19)  SpO2: 98% (25 Jan 2018 10:28) (97% - 98%)
Vital Signs Last 24 Hrs  T(C): 36.9 (26 Jan 2018 12:41), Max: 36.9 (26 Jan 2018 06:36)  T(F): 98.4 (26 Jan 2018 12:41), Max: 98.5 (26 Jan 2018 06:36)  HR: 103 (26 Jan 2018 12:41) (97 - 108)  BP: 147/96 (26 Jan 2018 12:41) (119/74 - 172/99)  BP(mean): --  RR: 18 (26 Jan 2018 12:41) (16 - 18)  SpO2: 98% (26 Jan 2018 12:41) (96% - 100%)

## 2021-02-22 NOTE — H&P ADULT - PMH
LVM for patient.
Patient lvm stating he would like a refill on the meloxicam. Please call to discuss.
Hypertension

## 2021-05-04 ENCOUNTER — APPOINTMENT (OUTPATIENT)
Dept: RHEUMATOLOGY | Facility: CLINIC | Age: 41
End: 2021-05-04
Payer: MEDICAID

## 2021-05-04 ENCOUNTER — LABORATORY RESULT (OUTPATIENT)
Age: 41
End: 2021-05-04

## 2021-05-04 VITALS
OXYGEN SATURATION: 97 % | HEART RATE: 91 BPM | HEIGHT: 61 IN | TEMPERATURE: 95.3 F | WEIGHT: 237 LBS | BODY MASS INDEX: 44.75 KG/M2 | SYSTOLIC BLOOD PRESSURE: 143 MMHG | DIASTOLIC BLOOD PRESSURE: 92 MMHG

## 2021-05-04 DIAGNOSIS — M25.50 PAIN IN UNSPECIFIED JOINT: ICD-10-CM

## 2021-05-04 DIAGNOSIS — M54.2 CERVICALGIA: ICD-10-CM

## 2021-05-04 DIAGNOSIS — M54.5 LOW BACK PAIN: ICD-10-CM

## 2021-05-04 DIAGNOSIS — R76.8 OTHER SPECIFIED ABNORMAL IMMUNOLOGICAL FINDINGS IN SERUM: ICD-10-CM

## 2021-05-04 PROCEDURE — 99072 ADDL SUPL MATRL&STAF TM PHE: CPT

## 2021-05-04 PROCEDURE — 99204 OFFICE O/P NEW MOD 45 MIN: CPT

## 2021-05-04 RX ORDER — FLUCONAZOLE 150 MG/1
150 TABLET ORAL
Qty: 1 | Refills: 0 | Status: DISCONTINUED | COMMUNITY
Start: 2019-08-14 | End: 2021-05-04

## 2021-05-04 RX ORDER — DIAZEPAM 5 MG/1
5 TABLET ORAL
Refills: 0 | Status: DISCONTINUED | COMMUNITY
Start: 2018-02-07 | End: 2021-05-04

## 2021-05-04 RX ORDER — ROPINIROLE 0.25 MG/1
0.25 TABLET, FILM COATED ORAL 3 TIMES DAILY
Refills: 0 | Status: ACTIVE | COMMUNITY
Start: 2021-05-04

## 2021-05-04 RX ORDER — ZOLPIDEM TARTRATE 10 MG/1
10 TABLET ORAL
Qty: 30 | Refills: 3 | Status: ACTIVE | COMMUNITY
Start: 2021-05-04

## 2021-05-05 LAB
BASOPHILS # BLD AUTO: 0.04 K/UL
BASOPHILS NFR BLD AUTO: 0.6 %
EOSINOPHIL # BLD AUTO: 0.29 K/UL
EOSINOPHIL NFR BLD AUTO: 4.2 %
HCT VFR BLD CALC: 41.3 %
HGB BLD-MCNC: 12.7 G/DL
IMM GRANULOCYTES NFR BLD AUTO: 0.1 %
LYMPHOCYTES # BLD AUTO: 1.98 K/UL
LYMPHOCYTES NFR BLD AUTO: 28.9 %
MAN DIFF?: NORMAL
MCHC RBC-ENTMCNC: 26.2 PG
MCHC RBC-ENTMCNC: 30.8 GM/DL
MCV RBC AUTO: 85.2 FL
MONOCYTES # BLD AUTO: 0.4 K/UL
MONOCYTES NFR BLD AUTO: 5.8 %
NEUTROPHILS # BLD AUTO: 4.14 K/UL
NEUTROPHILS NFR BLD AUTO: 60.4 %
PLATELET # BLD AUTO: 273 K/UL
RBC # BLD: 4.85 M/UL
RBC # FLD: 14.3 %
WBC # FLD AUTO: 6.86 K/UL

## 2021-05-19 DIAGNOSIS — M06.9 RHEUMATOID ARTHRITIS, UNSPECIFIED: ICD-10-CM

## 2021-05-19 LAB
ACTH-ESO: 11 PG/ML
ALBUMIN MFR SERPL ELPH: 64.8 %
ALBUMIN SERPL ELPH-MCNC: 5.1 G/DL
ALBUMIN SERPL-MCNC: 4.7 G/DL
ALBUMIN/GLOB SERPL: 1.9 RATIO
ALP BLD-CCNC: 62 U/L
ALPHA1 GLOB MFR SERPL ELPH: 3.7 %
ALPHA1 GLOB SERPL ELPH-MCNC: 0.3 G/DL
ALPHA2 GLOB MFR SERPL ELPH: 10.3 %
ALPHA2 GLOB SERPL ELPH-MCNC: 0.7 G/DL
ALT SERPL-CCNC: 12 U/L
ANA PAT FLD IF-IMP: ABNORMAL
ANA SER IF-ACNC: ABNORMAL
ANION GAP SERPL CALC-SCNC: 16 MMOL/L
APPEARANCE: CLEAR
AST SERPL-CCNC: 18 U/L
B-GLOBULIN MFR SERPL ELPH: 10.6 %
B-GLOBULIN SERPL ELPH-MCNC: 0.8 G/DL
BILIRUB SERPL-MCNC: 0.2 MG/DL
BILIRUBIN URINE: NEGATIVE
BLOOD URINE: ABNORMAL
BUN SERPL-MCNC: 14 MG/DL
CALCIUM SERPL-MCNC: 9.8 MG/DL
CCP AB SER IA-ACNC: <8 UNITS
CHLORIDE SERPL-SCNC: 102 MMOL/L
CO2 SERPL-SCNC: 23 MMOL/L
COLOR: NORMAL
CORTICOSTEROID BIND GLOBULIN: 2.6 MG/DL
CORTIS SERPL-MCNC: 5.7 UG/DL
CORTISOL, FREE: 0.16 UG/DL
CREAT SERPL-MCNC: 0.81 MG/DL
CRP SERPL-MCNC: <3 MG/L
DEPRECATED KAPPA LC FREE/LAMBDA SER: 1.01 RATIO
DSDNA AB SER-ACNC: <12 IU/ML
ENA RNP AB SER IA-ACNC: 0.4 AL
ENA SM AB SER IA-ACNC: <0.2 AL
ENA SS-A AB SER IA-ACNC: <0.2 AL
ENA SS-B AB SER IA-ACNC: <0.2 AL
ERYTHROCYTE [SEDIMENTATION RATE] IN BLOOD BY WESTERGREN METHOD: 12 MM/HR
GAMMA GLOB FLD ELPH-MCNC: 0.8 G/DL
GAMMA GLOB MFR SERPL ELPH: 10.6 %
GLUCOSE QUALITATIVE U: NEGATIVE
GLUCOSE SERPL-MCNC: 100 MG/DL
IGA SER QL IEP: 82 MG/DL
IGG SER QL IEP: 865 MG/DL
IGM SER QL IEP: 46 MG/DL
INTERPRETATION SERPL IEP-IMP: NORMAL
KAPPA LC CSF-MCNC: 1.02 MG/DL
KAPPA LC SERPL-MCNC: 1.03 MG/DL
KETONES URINE: NEGATIVE
LEUKOCYTE ESTERASE URINE: NEGATIVE
M PROTEIN SPEC IFE-MCNC: NORMAL
NITRITE URINE: NEGATIVE
PFCX: 2.8 %
PH URINE: 6.5
POTASSIUM SERPL-SCNC: 4.7 MMOL/L
PROT SERPL-MCNC: 7.2 G/DL
PROTEIN URINE: NORMAL
RF+CCP IGG SER-IMP: NEGATIVE
RHEUMATOID FACT SER QL: <10 IU/ML
SODIUM SERPL-SCNC: 141 MMOL/L
SPECIFIC GRAVITY URINE: 1.01
THYROGLOB AB SERPL-ACNC: <20 IU/ML
THYROPEROXIDASE AB SERPL IA-ACNC: <10 IU/ML
TSH SERPL-ACNC: 1.52 UIU/ML
UROBILINOGEN URINE: NORMAL

## 2021-05-19 RX ORDER — HYDROXYCHLOROQUINE SULFATE 200 MG/1
200 TABLET, FILM COATED ORAL
Qty: 60 | Refills: 3 | Status: ACTIVE | COMMUNITY
Start: 2021-05-19 | End: 1900-01-01

## 2021-06-05 NOTE — ED CDU PROVIDER INITIAL DAY NOTE - MEDICAL DECISION MAKING DETAILS
Name band;
Ronnie FERNANDEZ: Patient is a 36 yo F w/hx of HTN, anxiety, chronic urticaria, ? undiagnosed rheumatologic d/o here for palpitations, sob and urticaria x 1 week. Patient reports recent change in her medications by her psychiatrist who changed her from xanax to valium and told her to discontinue benadryl and take hydroxyzine. Since the change she reports an increase in her symptoms. Patient is also on clonidine for her high blood pressure as well as zoloft. No travel. + chest tightness.  No leg swelling or calf tenderness. Patient had neg workup: trop neg x 1, CTA neg for PE but continues to be tachycardic. Patient in CDU for echo and tele monitoring.

## 2021-06-18 ENCOUNTER — APPOINTMENT (OUTPATIENT)
Dept: PEDIATRIC ALLERGY IMMUNOLOGY | Facility: CLINIC | Age: 41
End: 2021-06-18
Payer: MEDICAID

## 2021-06-18 VITALS
SYSTOLIC BLOOD PRESSURE: 134 MMHG | DIASTOLIC BLOOD PRESSURE: 83 MMHG | WEIGHT: 235 LBS | BODY MASS INDEX: 44.4 KG/M2 | TEMPERATURE: 96.1 F | HEART RATE: 96 BPM | OXYGEN SATURATION: 97 %

## 2021-06-18 DIAGNOSIS — Z13.228 ENCOUNTER FOR SCREENING FOR OTHER SUSPECTED ENDOCRINE DISORDER: ICD-10-CM

## 2021-06-18 DIAGNOSIS — J30.9 ALLERGIC RHINITIS, UNSPECIFIED: ICD-10-CM

## 2021-06-18 DIAGNOSIS — Z13.21 ENCOUNTER FOR SCREENING FOR OTHER SUSPECTED ENDOCRINE DISORDER: ICD-10-CM

## 2021-06-18 DIAGNOSIS — B37.0 CANDIDAL STOMATITIS: ICD-10-CM

## 2021-06-18 DIAGNOSIS — Z13.29 ENCOUNTER FOR SCREENING FOR OTHER SUSPECTED ENDOCRINE DISORDER: ICD-10-CM

## 2021-06-18 DIAGNOSIS — L50.8 OTHER URTICARIA: ICD-10-CM

## 2021-06-18 DIAGNOSIS — J32.9 CHRONIC SINUSITIS, UNSPECIFIED: ICD-10-CM

## 2021-06-18 DIAGNOSIS — Z13.0 ENCOUNTER FOR SCREENING FOR OTHER SUSPECTED ENDOCRINE DISORDER: ICD-10-CM

## 2021-06-18 DIAGNOSIS — Z23 ENCOUNTER FOR IMMUNIZATION: ICD-10-CM

## 2021-06-18 DIAGNOSIS — T50.905A ADVERSE EFFECT OF UNSPECIFIED DRUGS, MEDICAMENTS AND BIOLOGICAL SUBSTANCES, INITIAL ENCOUNTER: ICD-10-CM

## 2021-06-18 PROCEDURE — 36415 COLL VENOUS BLD VENIPUNCTURE: CPT

## 2021-06-18 PROCEDURE — 99204 OFFICE O/P NEW MOD 45 MIN: CPT | Mod: 25

## 2021-06-18 PROCEDURE — 99072 ADDL SUPL MATRL&STAF TM PHE: CPT

## 2021-06-18 RX ORDER — MOMETASONE FUROATE MONOHYDRATE 50 UG/1
50 SPRAY, METERED NASAL
Refills: 0 | Status: ACTIVE | COMMUNITY

## 2021-06-18 RX ORDER — FEXOFENADINE HCL 60 MG
TABLET ORAL
Refills: 0 | Status: ACTIVE | COMMUNITY

## 2021-06-18 NOTE — SOCIAL HISTORY
[House] : [unfilled] lives in a house  [Radiator/Baseboard] : heating provided by radiator(s)/baseboard(s) [Window Units] : air conditioning provided by window units [Dry] : dry [Flooding] : flooding [Bedroom] :  in bedroom [Cat] : cat [Smokers in Household] : there are smokers in the home [Humidifier] : does not use a humidifier [Dehumidifier] : does not use a dehumidifier [Cockroaches] : Patient states that there are no cockroaches in the home [Dust Mite Covers] : does not have dust mite covers [Feather Pillows] : does not have feather pillows [Feather Comforter] : does not have a feather comforter [Living Area] : not in the living area

## 2021-06-18 NOTE — HISTORY OF PRESENT ILLNESS
[Asthma] : asthma [Food Allergies] : food allergies [de-identified] : 40-year-old female with hypertension presenting for chronic urticaria, need for COVID19 vaccine, chronic sinusitis, recurrent oral thrush and medication allergy.\par \par Chronic urticaria and angioedema: Started after mono in early 20s, lasted for a few years. Would have daily hives and intermittent facial angioedema. No hives for years at this time. Concerned about potential recurrence of condition after receiving COVID19 vaccine. \par \par Medication allergy:\par Ceclor - generalized hives in infancy. Timing unclear. In college took an antibiotic similar to Ceclor, can't recall name, developed hives head to toe and facial swelling. Seen in the ER. This reaction occurred 24 hr after dose. \par Erythromycin - purple warm rash that lasted for days. \par Neosporin - exacerbates the rash she applied it for.\par \par Recurrent sinusitis, allergic rhinitis: Used to have recurrent chronic sinusitis along with postnasal drip and nasal congestion. Symptoms improved after starting Flonase and Allegra. No sinusitis since then. Has snoring, suspects she has apnea. Planning sleep study. \par \par Oral thrush: Has history of recurrent oral thrush, responds to Nystatin. Also has recurrent rash on chest. No culture. Has normal immunoglobulin levels, no other immune evaluation. No hospitalization or IV antibiotics due to severe infections. \par \par Family history significant for:\par - mgm with breast and skin cancer, maternal aunt with lung cancer, maternal aunt with thyroid cancer\par - no infertility, miscarriages, stillbirth, early child death\par - both parents of [place] ancestary\par

## 2021-06-18 NOTE — PHYSICAL EXAM
[Alert] : alert [Well Nourished] : well nourished [Healthy Appearance] : healthy appearance [No Acute Distress] : no acute distress [Well Developed] : well developed [Normal Pupil & Iris Size/Symmetry] : normal pupil and iris size and symmetry [No Discharge] : no discharge [No Photophobia] : no photophobia [Sclera Not Icteric] : sclera not icteric [Conjunctival Erythema] : no conjunctival erythema [Suborbital Bogginess] : no suborbital bogginess (allergic shiners) [Normal TMs] : both tympanic membranes were normal [Normal Nasal Mucosa] : the nasal mucosa was normal [Normal Lips/Tongue] : the lips and tongue were normal [Normal Outer Ear/Nose] : the ears and nose were normal in appearance [No Nasal Discharge] : no nasal discharge [Normal Tonsils] : normal tonsils [No Thrush] : no thrush [Pale mucosa] : no pale mucosa [Boggy Nasal Turbinates] : boggy and/or pale nasal turbinates [Posterior Pharyngeal Cobblestoning] : posterior pharyngeal cobblestoning [Supple] : the neck was supple [Normal Rate and Effort] : normal respiratory rhythm and effort [No Crackles] : no crackles [No Retractions] : no retractions [Bilateral Audible Breath Sounds] : bilateral audible breath sounds [Wheezing] : no wheezing was heard [Normal Rate] : heart rate was normal  [Normal S1, S2] : normal S1 and S2 [No murmur] : no murmur [Regular Rhythm] : with a regular rhythm [Not Distended] : not distended [Normal Cervical Lymph Nodes] : cervical [Skin Intact] : skin intact  [No Rash] : no rash [No Skin Lesions] : no skin lesions [No Cyanosis] : no cyanosis [Normal Mood] : mood was normal [Normal Affect] : affect was normal [Alert, Awake, Oriented as Age-Appropriate] : alert, awake, oriented as age appropriate

## 2021-06-18 NOTE — CONSULT LETTER
[Dear  ___] : Dear  [unfilled], [Consult Letter:] : I had the pleasure of evaluating your patient, [unfilled]. [Please see my note below.] : Please see my note below. [Consult Closing:] : Thank you very much for allowing me to participate in the care of this patient.  If you have any questions, please do not hesitate to contact me. [Sincerely,] : Sincerely, [FreeTextEntry3] : Ana Carey MD\par Attending, Division of Allergy and Immunology\par [facility]

## 2021-07-07 NOTE — H&P ADULT - MINUTES
90

## 2021-10-24 ENCOUNTER — TRANSCRIPTION ENCOUNTER (OUTPATIENT)
Age: 41
End: 2021-10-24

## 2022-09-04 NOTE — PROGRESS NOTE BEHAVIORAL HEALTH - RISK ASSESSMENT
No
Not a risk for self or other at moment of exam.

## 2022-10-09 ENCOUNTER — NON-APPOINTMENT (OUTPATIENT)
Age: 42
End: 2022-10-09

## 2023-03-27 NOTE — H&P ADULT - NSTOBACCOSCREENHP_GEN_A_NCS
Provider Procedure Text (A): After obtaining clear surgical margins the defect was repaired by another provider. No

## 2023-05-16 ENCOUNTER — NON-APPOINTMENT (OUTPATIENT)
Age: 43
End: 2023-05-16

## 2023-05-16 ENCOUNTER — APPOINTMENT (OUTPATIENT)
Dept: CARDIOLOGY | Facility: CLINIC | Age: 43
End: 2023-05-16
Payer: MEDICAID

## 2023-05-16 VITALS
SYSTOLIC BLOOD PRESSURE: 140 MMHG | WEIGHT: 233 LBS | HEART RATE: 85 BPM | BODY MASS INDEX: 43.99 KG/M2 | OXYGEN SATURATION: 98 % | HEIGHT: 61 IN | DIASTOLIC BLOOD PRESSURE: 88 MMHG

## 2023-05-16 PROCEDURE — 93000 ELECTROCARDIOGRAM COMPLETE: CPT

## 2023-05-16 PROCEDURE — 99205 OFFICE O/P NEW HI 60 MIN: CPT | Mod: 25

## 2023-05-17 LAB
ALBUMIN SERPL ELPH-MCNC: 4.8 G/DL
ALP BLD-CCNC: 52 U/L
ALT SERPL-CCNC: 12 U/L
ANION GAP SERPL CALC-SCNC: 10 MMOL/L
AST SERPL-CCNC: 19 U/L
BASOPHILS # BLD AUTO: 0.05 K/UL
BASOPHILS NFR BLD AUTO: 0.9 %
BILIRUB SERPL-MCNC: 0.2 MG/DL
BUN SERPL-MCNC: 14 MG/DL
CALCIUM SERPL-MCNC: 9.5 MG/DL
CHLORIDE SERPL-SCNC: 104 MMOL/L
CHOLEST SERPL-MCNC: 235 MG/DL
CO2 SERPL-SCNC: 25 MMOL/L
CREAT SERPL-MCNC: 0.78 MG/DL
EGFR: 98 ML/MIN/1.73M2
EOSINOPHIL # BLD AUTO: 0.21 K/UL
EOSINOPHIL NFR BLD AUTO: 3.8 %
ESTIMATED AVERAGE GLUCOSE: 114 MG/DL
GLUCOSE SERPL-MCNC: 102 MG/DL
HBA1C MFR BLD HPLC: 5.6 %
HCT VFR BLD CALC: 37.8 %
HDLC SERPL-MCNC: 45 MG/DL
HGB BLD-MCNC: 12.4 G/DL
IMM GRANULOCYTES NFR BLD AUTO: 0.2 %
LDLC SERPL CALC-MCNC: 144 MG/DL
LYMPHOCYTES # BLD AUTO: 1.53 K/UL
LYMPHOCYTES NFR BLD AUTO: 27.5 %
MAN DIFF?: NORMAL
MCHC RBC-ENTMCNC: 27.5 PG
MCHC RBC-ENTMCNC: 32.8 GM/DL
MCV RBC AUTO: 83.8 FL
MONOCYTES # BLD AUTO: 0.32 K/UL
MONOCYTES NFR BLD AUTO: 5.8 %
NEUTROPHILS # BLD AUTO: 3.44 K/UL
NEUTROPHILS NFR BLD AUTO: 61.8 %
NONHDLC SERPL-MCNC: 190 MG/DL
PLATELET # BLD AUTO: 232 K/UL
POTASSIUM SERPL-SCNC: 4.5 MMOL/L
PROT SERPL-MCNC: 6.9 G/DL
RBC # BLD: 4.51 M/UL
RBC # FLD: 14.6 %
SODIUM SERPL-SCNC: 139 MMOL/L
TRIGL SERPL-MCNC: 229 MG/DL
TSH SERPL-ACNC: 0.77 UIU/ML
WBC # FLD AUTO: 5.56 K/UL

## 2023-05-24 ENCOUNTER — APPOINTMENT (OUTPATIENT)
Dept: CARDIOLOGY | Facility: CLINIC | Age: 43
End: 2023-05-24
Payer: MEDICAID

## 2023-05-24 PROCEDURE — 99215 OFFICE O/P EST HI 40 MIN: CPT | Mod: 95

## 2023-06-05 ENCOUNTER — APPOINTMENT (OUTPATIENT)
Dept: CARDIOLOGY | Facility: CLINIC | Age: 43
End: 2023-06-05
Payer: MEDICAID

## 2023-06-05 PROCEDURE — 97802 MEDICAL NUTRITION INDIV IN: CPT | Mod: 95

## 2023-06-29 ENCOUNTER — NON-APPOINTMENT (OUTPATIENT)
Age: 43
End: 2023-06-29

## 2023-06-29 ENCOUNTER — APPOINTMENT (OUTPATIENT)
Dept: CARDIOLOGY | Facility: CLINIC | Age: 43
End: 2023-06-29
Payer: MEDICAID

## 2023-06-29 VITALS — WEIGHT: 217.56 LBS | HEIGHT: 61 IN | BODY MASS INDEX: 41.07 KG/M2

## 2023-06-29 DIAGNOSIS — E66.9 OBESITY, UNSPECIFIED: ICD-10-CM

## 2023-06-29 PROCEDURE — 99215 OFFICE O/P EST HI 40 MIN: CPT | Mod: 95

## 2023-06-29 PROCEDURE — 97802 MEDICAL NUTRITION INDIV IN: CPT | Mod: 95

## 2023-07-07 ENCOUNTER — APPOINTMENT (OUTPATIENT)
Dept: CARDIOLOGY | Facility: CLINIC | Age: 43
End: 2023-07-07
Payer: MEDICAID

## 2023-07-07 PROCEDURE — 99214 OFFICE O/P EST MOD 30 MIN: CPT | Mod: 95

## 2023-07-27 ENCOUNTER — APPOINTMENT (OUTPATIENT)
Dept: CARDIOLOGY | Facility: CLINIC | Age: 43
End: 2023-07-27

## 2023-08-04 ENCOUNTER — APPOINTMENT (OUTPATIENT)
Dept: CARDIOLOGY | Facility: CLINIC | Age: 43
End: 2023-08-04
Payer: MEDICAID

## 2023-08-04 PROCEDURE — 99213 OFFICE O/P EST LOW 20 MIN: CPT | Mod: 95

## 2023-08-04 NOTE — HISTORY OF PRESENT ILLNESS
[FreeTextEntry1] : Ms. SEBASTIAN LANCASTER  is a 42 year old  female who has a past medical history significant for Charted but unlikelyAutoimmune Disease, Idiopathic Craniofacial Erythema, Prior Opioid Use (Stopped 15 years ago) on Suboxine, HTN, Tachycardia, BMI 44 who presents to the office for a follow up evaluation. Patient feels generally well today. Denies SOB, chest pain, palpitations, dizziness, and syncope.  ================ ================ 5/2023 bmi 44 aunt - thyroid cancer - not medullary  doesn't eat good - doesn't eat all day, will have dinner and then eats unhealthy snacks at night sleep issues will see leandra RD ============= 7/2023 on 2.5mg down 15lbs appetite suppression has constipation  night time snacks - cut in half continuing to see leandra RD ============ 8/2023 constipation getting worse  but 2-3 days has improved  thrush has returned  still snacking at night  up studying late for recertification

## 2023-08-04 NOTE — DISCUSSION/SUMMARY
[FreeTextEntry1] : BMI 44/HTN/Metabolic syndrome  - Patient to continue mounjaro 2.5mg once weekly for 4 more weeks due to constipation   - will stop med if constipation continues to get worse  - will increase fiber intake/water  - to [facility] to [facility] - A detailed discussion took place about the importance of CV risk reduction   - The patient understands the importance of incorporating moderate aerobic exercise, 4 times/week for 40 minutes to reduce risk of CV disease. - A detailed discussion of lifestyle modification was done today. Patient understands the importance of a heart healthy diet and incorporating veggies/legumes/fruits/whole grains and limiting processed foods, sugars and carbs in their diet. - patient understands that stress reduction along with good sleep hygiene will help aid in weight loss  - Follow up in 4 weeks   - The patient has a good understanding of the diagnosis, treatment plan and lifestyle modification.  she will contact me at the office for any questions with their care or any changes in their health status.  Case discussed with Dr Jv Bales

## 2023-09-15 ENCOUNTER — APPOINTMENT (OUTPATIENT)
Dept: CARDIOLOGY | Facility: CLINIC | Age: 43
End: 2023-09-15
Payer: MEDICAID

## 2023-09-15 VITALS — WEIGHT: 208 LBS | BODY MASS INDEX: 39.27 KG/M2 | HEIGHT: 61 IN

## 2023-09-15 PROCEDURE — 99213 OFFICE O/P EST LOW 20 MIN: CPT | Mod: 95

## 2023-10-10 ENCOUNTER — APPOINTMENT (OUTPATIENT)
Dept: CARDIOLOGY | Facility: CLINIC | Age: 43
End: 2023-10-10
Payer: MEDICAID

## 2023-10-16 ENCOUNTER — APPOINTMENT (OUTPATIENT)
Dept: CARDIOLOGY | Facility: CLINIC | Age: 43
End: 2023-10-16
Payer: MEDICAID

## 2023-10-16 PROCEDURE — 99214 OFFICE O/P EST MOD 30 MIN: CPT | Mod: 95

## 2023-11-13 ENCOUNTER — APPOINTMENT (OUTPATIENT)
Dept: CARDIOLOGY | Facility: CLINIC | Age: 43
End: 2023-11-13
Payer: MEDICAID

## 2023-11-13 VITALS — WEIGHT: 194 LBS | BODY MASS INDEX: 36.63 KG/M2 | HEIGHT: 61 IN

## 2023-11-13 PROCEDURE — 99213 OFFICE O/P EST LOW 20 MIN: CPT | Mod: 95

## 2024-01-31 ENCOUNTER — APPOINTMENT (OUTPATIENT)
Dept: CARDIOLOGY | Facility: CLINIC | Age: 44
End: 2024-01-31
Payer: MEDICAID

## 2024-01-31 VITALS — WEIGHT: 190 LBS | HEIGHT: 61 IN | BODY MASS INDEX: 35.87 KG/M2

## 2024-01-31 PROCEDURE — 99213 OFFICE O/P EST LOW 20 MIN: CPT | Mod: 95

## 2024-01-31 PROCEDURE — G2211 COMPLEX E/M VISIT ADD ON: CPT | Mod: NC,1L

## 2024-02-21 NOTE — ASU DISCHARGE PLAN (ADULT/PEDIATRIC) - CALL YOUR DOCTOR IF YOU HAVE ANY OF THE FOLLOWING:
Inability to tolerate liquids or foods/Bleeding that does not stop/Unable to urinate/Pain not relieved by Medications/Swelling that gets worse/Fever greater than (need to indicate Fahrenheit or Celsius)/Increased irritability or sluggishness/Wound/Surgical Site with redness, or foul smelling discharge or pus/Numbness, tingling, color or temperature change to extremity/Nausea and vomiting that does not stop/Excessive diarrhea Detail Level: Detailed Number Of Freeze-Thaw Cycles: 2 freeze-thaw cycles Duration Of Freeze Thaw-Cycle (Seconds): 2 Post-Care Instructions: I reviewed with the patient in detail post-care instructions. Patient is to wear sunprotection, and avoid picking at any of the treated lesions. Pt may apply Vaseline to crusted or scabbing areas. Written instructions provided Show Aperture Variable?: Yes Consent: The patient's consent was obtained including but not limited to risks of crusting, scabbing, blistering, scarring, darker or lighter pigmentary change, recurrence, incomplete removal and infection. Render Note In Bullet Format When Appropriate: No

## 2024-04-08 PROBLEM — E88.810 METABOLIC SYNDROME: Status: ACTIVE | Noted: 2017-04-07

## 2024-04-10 ENCOUNTER — APPOINTMENT (OUTPATIENT)
Dept: CARDIOLOGY | Facility: CLINIC | Age: 44
End: 2024-04-10

## 2024-04-10 DIAGNOSIS — E88.810 METABOLIC SYNDROME: ICD-10-CM

## 2024-04-12 ENCOUNTER — NON-APPOINTMENT (OUTPATIENT)
Age: 44
End: 2024-04-12

## 2024-06-06 ENCOUNTER — APPOINTMENT (OUTPATIENT)
Dept: ULTRASOUND IMAGING | Facility: CLINIC | Age: 44
End: 2024-06-06
Payer: MEDICAID

## 2024-06-06 ENCOUNTER — OUTPATIENT (OUTPATIENT)
Dept: OUTPATIENT SERVICES | Facility: HOSPITAL | Age: 44
LOS: 1 days | End: 2024-06-06
Payer: MEDICAID

## 2024-06-06 DIAGNOSIS — Z00.00 ENCOUNTER FOR GENERAL ADULT MEDICAL EXAMINATION WITHOUT ABNORMAL FINDINGS: ICD-10-CM

## 2024-06-06 PROCEDURE — 76705 ECHO EXAM OF ABDOMEN: CPT | Mod: 26

## 2024-06-06 PROCEDURE — 76705 ECHO EXAM OF ABDOMEN: CPT

## 2024-06-12 ENCOUNTER — APPOINTMENT (OUTPATIENT)
Dept: CARDIOLOGY | Facility: CLINIC | Age: 44
End: 2024-06-12

## 2024-06-12 DIAGNOSIS — I10 ESSENTIAL (PRIMARY) HYPERTENSION: ICD-10-CM

## 2024-06-18 RX ORDER — TIRZEPATIDE 7.5 MG/.5ML
7.5 INJECTION, SOLUTION SUBCUTANEOUS
Qty: 30 | Refills: 0 | Status: ACTIVE | COMMUNITY
Start: 2023-05-24 | End: 1900-01-01

## 2024-07-15 ENCOUNTER — APPOINTMENT (OUTPATIENT)
Dept: CARDIOLOGY | Facility: CLINIC | Age: 44
End: 2024-07-15
Payer: COMMERCIAL

## 2024-07-15 DIAGNOSIS — E88.810 METABOLIC SYNDROME: ICD-10-CM

## 2024-07-15 DIAGNOSIS — I10 ESSENTIAL (PRIMARY) HYPERTENSION: ICD-10-CM

## 2024-07-15 PROCEDURE — G2211 COMPLEX E/M VISIT ADD ON: CPT

## 2024-07-15 PROCEDURE — 99214 OFFICE O/P EST MOD 30 MIN: CPT

## 2024-07-15 RX ORDER — TIRZEPATIDE 7.5 MG/.5ML
7.5 INJECTION, SOLUTION SUBCUTANEOUS
Qty: 1 | Refills: 1 | Status: ACTIVE | COMMUNITY
Start: 2024-07-15 | End: 1900-01-01

## 2024-08-01 ENCOUNTER — TRANSCRIPTION ENCOUNTER (OUTPATIENT)
Age: 44
End: 2024-08-01

## 2024-09-10 ENCOUNTER — NON-APPOINTMENT (OUTPATIENT)
Age: 44
End: 2024-09-10

## 2024-09-24 ENCOUNTER — OUTPATIENT (OUTPATIENT)
Dept: OUTPATIENT SERVICES | Facility: HOSPITAL | Age: 44
LOS: 1 days | End: 2024-09-24
Payer: COMMERCIAL

## 2024-09-24 ENCOUNTER — APPOINTMENT (OUTPATIENT)
Dept: MRI IMAGING | Facility: HOSPITAL | Age: 44
End: 2024-09-24
Payer: COMMERCIAL

## 2024-09-24 DIAGNOSIS — Z92.89 PERSONAL HISTORY OF OTHER MEDICAL TREATMENT: Chronic | ICD-10-CM

## 2024-09-24 DIAGNOSIS — S89.91XA UNSPECIFIED INJURY OF RIGHT LOWER LEG, INITIAL ENCOUNTER: ICD-10-CM

## 2024-09-24 PROCEDURE — 73721 MRI JNT OF LWR EXTRE W/O DYE: CPT

## 2024-09-24 PROCEDURE — 73721 MRI JNT OF LWR EXTRE W/O DYE: CPT | Mod: 26,RT

## 2024-10-03 ENCOUNTER — APPOINTMENT (OUTPATIENT)
Dept: ORTHOPEDIC SURGERY | Facility: CLINIC | Age: 44
End: 2024-10-03
Payer: COMMERCIAL

## 2024-10-03 ENCOUNTER — NON-APPOINTMENT (OUTPATIENT)
Age: 44
End: 2024-10-03

## 2024-10-03 VITALS — HEIGHT: 61 IN | BODY MASS INDEX: 28.32 KG/M2 | WEIGHT: 150 LBS

## 2024-10-03 DIAGNOSIS — M17.12 UNILATERAL PRIMARY OSTEOARTHRITIS, LEFT KNEE: ICD-10-CM

## 2024-10-03 DIAGNOSIS — M54.16 RADICULOPATHY, LUMBAR REGION: ICD-10-CM

## 2024-10-03 DIAGNOSIS — M22.2X1 PATELLOFEMORAL DISORDERS, RIGHT KNEE: ICD-10-CM

## 2024-10-03 DIAGNOSIS — M17.11 UNILATERAL PRIMARY OSTEOARTHRITIS, RIGHT KNEE: ICD-10-CM

## 2024-10-03 PROCEDURE — 73564 X-RAY EXAM KNEE 4 OR MORE: CPT | Mod: LT

## 2024-10-03 PROCEDURE — 99204 OFFICE O/P NEW MOD 45 MIN: CPT

## 2025-01-23 ENCOUNTER — APPOINTMENT (OUTPATIENT)
Dept: NEUROLOGY | Facility: CLINIC | Age: 45
End: 2025-01-23
Payer: COMMERCIAL

## 2025-01-23 ENCOUNTER — NON-APPOINTMENT (OUTPATIENT)
Age: 45
End: 2025-01-23

## 2025-01-23 VITALS
BODY MASS INDEX: 27.38 KG/M2 | HEART RATE: 84 BPM | TEMPERATURE: 98.1 F | SYSTOLIC BLOOD PRESSURE: 127 MMHG | OXYGEN SATURATION: 99 % | HEIGHT: 61 IN | DIASTOLIC BLOOD PRESSURE: 86 MMHG | WEIGHT: 145 LBS

## 2025-01-23 DIAGNOSIS — G43.E09 CHRONIC MIGRAINE WITH AURA, NOT INTRACTABLE, WITHOUT STATUS MIGRAINOSUS: ICD-10-CM

## 2025-01-23 DIAGNOSIS — G57.93 UNSPECIFIED MONONEUROPATHY OF BILATERAL LOWER LIMBS: ICD-10-CM

## 2025-01-23 DIAGNOSIS — G60.8 OTHER HEREDITARY AND IDIOPATHIC NEUROPATHIES: ICD-10-CM

## 2025-01-23 PROCEDURE — 99205 OFFICE O/P NEW HI 60 MIN: CPT

## 2025-01-23 PROCEDURE — G2211 COMPLEX E/M VISIT ADD ON: CPT

## 2025-02-18 ENCOUNTER — APPOINTMENT (OUTPATIENT)
Dept: RADIOLOGY | Facility: HOSPITAL | Age: 45
End: 2025-02-18

## 2025-02-18 ENCOUNTER — APPOINTMENT (OUTPATIENT)
Dept: MRI IMAGING | Facility: HOSPITAL | Age: 45
End: 2025-02-18

## 2025-02-18 ENCOUNTER — OUTPATIENT (OUTPATIENT)
Dept: OUTPATIENT SERVICES | Facility: HOSPITAL | Age: 45
LOS: 1 days | End: 2025-02-18
Payer: COMMERCIAL

## 2025-02-18 DIAGNOSIS — Z92.89 PERSONAL HISTORY OF OTHER MEDICAL TREATMENT: Chronic | ICD-10-CM

## 2025-02-18 DIAGNOSIS — R05.8 OTHER SPECIFIED COUGH: ICD-10-CM

## 2025-02-18 PROCEDURE — 71046 X-RAY EXAM CHEST 2 VIEWS: CPT

## 2025-02-18 PROCEDURE — 71046 X-RAY EXAM CHEST 2 VIEWS: CPT | Mod: 26

## 2025-03-08 ENCOUNTER — TRANSCRIPTION ENCOUNTER (OUTPATIENT)
Age: 45
End: 2025-03-08

## 2025-03-09 ENCOUNTER — NON-APPOINTMENT (OUTPATIENT)
Age: 45
End: 2025-03-09

## 2025-03-11 ENCOUNTER — APPOINTMENT (OUTPATIENT)
Dept: CARDIOLOGY | Facility: CLINIC | Age: 45
End: 2025-03-11
Payer: COMMERCIAL

## 2025-03-11 ENCOUNTER — NON-APPOINTMENT (OUTPATIENT)
Age: 45
End: 2025-03-11

## 2025-03-11 VITALS
WEIGHT: 135 LBS | DIASTOLIC BLOOD PRESSURE: 70 MMHG | HEART RATE: 93 BPM | BODY MASS INDEX: 25.51 KG/M2 | SYSTOLIC BLOOD PRESSURE: 130 MMHG | OXYGEN SATURATION: 98 %

## 2025-03-11 DIAGNOSIS — I10 ESSENTIAL (PRIMARY) HYPERTENSION: ICD-10-CM

## 2025-03-11 DIAGNOSIS — E66.9 OBESITY, UNSPECIFIED: ICD-10-CM

## 2025-03-11 DIAGNOSIS — R00.0 TACHYCARDIA, UNSPECIFIED: ICD-10-CM

## 2025-03-11 PROCEDURE — G2211 COMPLEX E/M VISIT ADD ON: CPT

## 2025-03-11 PROCEDURE — 99215 OFFICE O/P EST HI 40 MIN: CPT

## 2025-03-11 PROCEDURE — 93000 ELECTROCARDIOGRAM COMPLETE: CPT

## 2025-03-11 PROCEDURE — G0537: CPT

## 2025-03-11 RX ORDER — PROPRANOLOL HYDROCHLORIDE 10 MG/1
10 TABLET ORAL
Qty: 20 | Refills: 1 | Status: ACTIVE | COMMUNITY
Start: 2025-03-11 | End: 1900-01-01

## 2025-03-16 NOTE — H&P ADULT - ASSESSMENT
37-year-old woman with history of anxiety and frequent hives who presents to the ED with palpitations. The patient has been having these symptoms for the past 1 week. She has been feeling palpitations occasionally for many years, but they have been exacerbated over the past week. She recently had her valium decreased from 10 mg to 5 mg. Her hives have been going on for several years and have also increased over the past few days. She endorses some anxiety, diarrhea, chest pressure but no nausea/vomiting. She is a former smoker.     symptoms possible withdraw from benzos  psych eval called  start ativan 1 mg q 6 hrs prn  resume zoloft I have re-evaluated the patient's fluid status and reviewed vital signs. Clinical perfusion assessment was performed.

## 2025-03-27 ENCOUNTER — APPOINTMENT (OUTPATIENT)
Dept: ORTHOPEDIC SURGERY | Facility: CLINIC | Age: 45
End: 2025-03-27
Payer: COMMERCIAL

## 2025-03-27 VITALS — HEIGHT: 61 IN | WEIGHT: 135 LBS | BODY MASS INDEX: 25.49 KG/M2

## 2025-03-27 DIAGNOSIS — M76.71 PERONEAL TENDINITIS, RIGHT LEG: ICD-10-CM

## 2025-03-27 DIAGNOSIS — M76.72 PERONEAL TENDINITIS, LEFT LEG: ICD-10-CM

## 2025-03-27 PROCEDURE — 99214 OFFICE O/P EST MOD 30 MIN: CPT

## 2025-04-05 ENCOUNTER — LABORATORY RESULT (OUTPATIENT)
Age: 45
End: 2025-04-05

## 2025-04-07 ENCOUNTER — NON-APPOINTMENT (OUTPATIENT)
Age: 45
End: 2025-04-07

## 2025-04-07 DIAGNOSIS — E87.5 HYPERKALEMIA: ICD-10-CM

## 2025-04-08 PROBLEM — E87.5 HYPERKALEMIA: Status: ACTIVE | Noted: 2025-04-08

## 2025-04-14 ENCOUNTER — APPOINTMENT (OUTPATIENT)
Dept: NEUROLOGY | Facility: CLINIC | Age: 45
End: 2025-04-14
Payer: COMMERCIAL

## 2025-04-14 VITALS
TEMPERATURE: 98.2 F | BODY MASS INDEX: 25.49 KG/M2 | OXYGEN SATURATION: 98 % | HEART RATE: 82 BPM | DIASTOLIC BLOOD PRESSURE: 76 MMHG | HEIGHT: 61 IN | SYSTOLIC BLOOD PRESSURE: 120 MMHG | WEIGHT: 135 LBS

## 2025-04-14 VITALS
HEIGHT: 61 IN | DIASTOLIC BLOOD PRESSURE: 76 MMHG | TEMPERATURE: 98.2 F | HEART RATE: 82 BPM | WEIGHT: 135 LBS | BODY MASS INDEX: 25.49 KG/M2 | OXYGEN SATURATION: 98 % | SYSTOLIC BLOOD PRESSURE: 120 MMHG

## 2025-04-14 DIAGNOSIS — R20.2 ANESTHESIA OF SKIN: ICD-10-CM

## 2025-04-14 DIAGNOSIS — G57.93 UNSPECIFIED MONONEUROPATHY OF BILATERAL LOWER LIMBS: ICD-10-CM

## 2025-04-14 DIAGNOSIS — G43.E09 CHRONIC MIGRAINE WITH AURA, NOT INTRACTABLE, WITHOUT STATUS MIGRAINOSUS: ICD-10-CM

## 2025-04-14 DIAGNOSIS — R20.0 ANESTHESIA OF SKIN: ICD-10-CM

## 2025-04-14 PROCEDURE — 99215 OFFICE O/P EST HI 40 MIN: CPT | Mod: 25

## 2025-04-14 PROCEDURE — 95885 MUSC TST DONE W/NERV TST LIM: CPT | Mod: 59

## 2025-04-14 PROCEDURE — 95912 NRV CNDJ TEST 11-12 STUDIES: CPT

## 2025-04-26 ENCOUNTER — NON-APPOINTMENT (OUTPATIENT)
Age: 45
End: 2025-04-26